# Patient Record
Sex: MALE | Race: WHITE | NOT HISPANIC OR LATINO | URBAN - METROPOLITAN AREA
[De-identification: names, ages, dates, MRNs, and addresses within clinical notes are randomized per-mention and may not be internally consistent; named-entity substitution may affect disease eponyms.]

---

## 2023-12-23 ENCOUNTER — INPATIENT (INPATIENT)
Facility: HOSPITAL | Age: 68
LOS: 5 days | Discharge: HOME | DRG: 683 | End: 2023-12-29
Attending: STUDENT IN AN ORGANIZED HEALTH CARE EDUCATION/TRAINING PROGRAM | Admitting: STUDENT IN AN ORGANIZED HEALTH CARE EDUCATION/TRAINING PROGRAM
Payer: MEDICARE

## 2023-12-23 VITALS
TEMPERATURE: 98 F | RESPIRATION RATE: 17 BRPM | DIASTOLIC BLOOD PRESSURE: 79 MMHG | OXYGEN SATURATION: 99 % | HEIGHT: 70 IN | HEART RATE: 98 BPM | SYSTOLIC BLOOD PRESSURE: 131 MMHG | WEIGHT: 177.91 LBS

## 2023-12-23 DIAGNOSIS — N17.9 ACUTE KIDNEY FAILURE, UNSPECIFIED: ICD-10-CM

## 2023-12-23 DIAGNOSIS — I10 ESSENTIAL (PRIMARY) HYPERTENSION: ICD-10-CM

## 2023-12-23 DIAGNOSIS — E11.9 TYPE 2 DIABETES MELLITUS WITHOUT COMPLICATIONS: ICD-10-CM

## 2023-12-23 DIAGNOSIS — E78.5 HYPERLIPIDEMIA, UNSPECIFIED: ICD-10-CM

## 2023-12-23 LAB
ALBUMIN SERPL ELPH-MCNC: 4.5 G/DL — SIGNIFICANT CHANGE UP (ref 3.3–5)
ALBUMIN SERPL ELPH-MCNC: 4.5 G/DL — SIGNIFICANT CHANGE UP (ref 3.3–5)
ALP SERPL-CCNC: 66 U/L — SIGNIFICANT CHANGE UP (ref 40–120)
ALP SERPL-CCNC: 66 U/L — SIGNIFICANT CHANGE UP (ref 40–120)
ALT FLD-CCNC: 15 U/L — SIGNIFICANT CHANGE UP (ref 10–45)
ALT FLD-CCNC: 15 U/L — SIGNIFICANT CHANGE UP (ref 10–45)
ANION GAP SERPL CALC-SCNC: 16 MMOL/L — SIGNIFICANT CHANGE UP (ref 5–17)
ANION GAP SERPL CALC-SCNC: 16 MMOL/L — SIGNIFICANT CHANGE UP (ref 5–17)
APPEARANCE UR: CLEAR — SIGNIFICANT CHANGE UP
APPEARANCE UR: CLEAR — SIGNIFICANT CHANGE UP
AST SERPL-CCNC: 12 U/L — SIGNIFICANT CHANGE UP (ref 10–40)
AST SERPL-CCNC: 12 U/L — SIGNIFICANT CHANGE UP (ref 10–40)
BACTERIA # UR AUTO: NEGATIVE /HPF — SIGNIFICANT CHANGE UP
BACTERIA # UR AUTO: NEGATIVE /HPF — SIGNIFICANT CHANGE UP
BASOPHILS # BLD AUTO: 0.1 K/UL — SIGNIFICANT CHANGE UP (ref 0–0.2)
BASOPHILS # BLD AUTO: 0.1 K/UL — SIGNIFICANT CHANGE UP (ref 0–0.2)
BASOPHILS NFR BLD AUTO: 1 % — SIGNIFICANT CHANGE UP (ref 0–2)
BASOPHILS NFR BLD AUTO: 1 % — SIGNIFICANT CHANGE UP (ref 0–2)
BILIRUB SERPL-MCNC: 0.2 MG/DL — SIGNIFICANT CHANGE UP (ref 0.2–1.2)
BILIRUB SERPL-MCNC: 0.2 MG/DL — SIGNIFICANT CHANGE UP (ref 0.2–1.2)
BILIRUB UR-MCNC: NEGATIVE — SIGNIFICANT CHANGE UP
BILIRUB UR-MCNC: NEGATIVE — SIGNIFICANT CHANGE UP
BUN SERPL-MCNC: 75 MG/DL — HIGH (ref 7–23)
BUN SERPL-MCNC: 75 MG/DL — HIGH (ref 7–23)
CALCIUM SERPL-MCNC: 10.1 MG/DL — SIGNIFICANT CHANGE UP (ref 8.4–10.5)
CALCIUM SERPL-MCNC: 10.1 MG/DL — SIGNIFICANT CHANGE UP (ref 8.4–10.5)
CAST: 0 /LPF — SIGNIFICANT CHANGE UP (ref 0–4)
CAST: 0 /LPF — SIGNIFICANT CHANGE UP (ref 0–4)
CHLORIDE SERPL-SCNC: 98 MMOL/L — SIGNIFICANT CHANGE UP (ref 96–108)
CHLORIDE SERPL-SCNC: 98 MMOL/L — SIGNIFICANT CHANGE UP (ref 96–108)
CO2 SERPL-SCNC: 20 MMOL/L — LOW (ref 22–31)
CO2 SERPL-SCNC: 20 MMOL/L — LOW (ref 22–31)
COLOR SPEC: YELLOW — SIGNIFICANT CHANGE UP
COLOR SPEC: YELLOW — SIGNIFICANT CHANGE UP
CREAT ?TM UR-MCNC: 36 MG/DL — SIGNIFICANT CHANGE UP
CREAT ?TM UR-MCNC: 36 MG/DL — SIGNIFICANT CHANGE UP
CREAT SERPL-MCNC: 3.47 MG/DL — HIGH (ref 0.5–1.3)
CREAT SERPL-MCNC: 3.47 MG/DL — HIGH (ref 0.5–1.3)
DIFF PNL FLD: ABNORMAL
DIFF PNL FLD: ABNORMAL
EGFR: 18 ML/MIN/1.73M2 — LOW
EGFR: 18 ML/MIN/1.73M2 — LOW
EOSINOPHIL # BLD AUTO: 0.37 K/UL — SIGNIFICANT CHANGE UP (ref 0–0.5)
EOSINOPHIL # BLD AUTO: 0.37 K/UL — SIGNIFICANT CHANGE UP (ref 0–0.5)
EOSINOPHIL NFR BLD AUTO: 3.5 % — SIGNIFICANT CHANGE UP (ref 0–6)
EOSINOPHIL NFR BLD AUTO: 3.5 % — SIGNIFICANT CHANGE UP (ref 0–6)
GLUCOSE BLDC GLUCOMTR-MCNC: 157 MG/DL — HIGH (ref 70–99)
GLUCOSE BLDC GLUCOMTR-MCNC: 157 MG/DL — HIGH (ref 70–99)
GLUCOSE SERPL-MCNC: 100 MG/DL — HIGH (ref 70–99)
GLUCOSE SERPL-MCNC: 100 MG/DL — HIGH (ref 70–99)
GLUCOSE UR QL: NEGATIVE MG/DL — SIGNIFICANT CHANGE UP
GLUCOSE UR QL: NEGATIVE MG/DL — SIGNIFICANT CHANGE UP
HCT VFR BLD CALC: 35.6 % — LOW (ref 39–50)
HCT VFR BLD CALC: 35.6 % — LOW (ref 39–50)
HGB BLD-MCNC: 11.6 G/DL — LOW (ref 13–17)
HGB BLD-MCNC: 11.6 G/DL — LOW (ref 13–17)
IMM GRANULOCYTES NFR BLD AUTO: 0.4 % — SIGNIFICANT CHANGE UP (ref 0–0.9)
IMM GRANULOCYTES NFR BLD AUTO: 0.4 % — SIGNIFICANT CHANGE UP (ref 0–0.9)
KETONES UR-MCNC: NEGATIVE MG/DL — SIGNIFICANT CHANGE UP
KETONES UR-MCNC: NEGATIVE MG/DL — SIGNIFICANT CHANGE UP
LEUKOCYTE ESTERASE UR-ACNC: NEGATIVE — SIGNIFICANT CHANGE UP
LEUKOCYTE ESTERASE UR-ACNC: NEGATIVE — SIGNIFICANT CHANGE UP
LYMPHOCYTES # BLD AUTO: 19.6 % — SIGNIFICANT CHANGE UP (ref 13–44)
LYMPHOCYTES # BLD AUTO: 19.6 % — SIGNIFICANT CHANGE UP (ref 13–44)
LYMPHOCYTES # BLD AUTO: 2.05 K/UL — SIGNIFICANT CHANGE UP (ref 1–3.3)
LYMPHOCYTES # BLD AUTO: 2.05 K/UL — SIGNIFICANT CHANGE UP (ref 1–3.3)
MCHC RBC-ENTMCNC: 26.6 PG — LOW (ref 27–34)
MCHC RBC-ENTMCNC: 26.6 PG — LOW (ref 27–34)
MCHC RBC-ENTMCNC: 32.6 GM/DL — SIGNIFICANT CHANGE UP (ref 32–36)
MCHC RBC-ENTMCNC: 32.6 GM/DL — SIGNIFICANT CHANGE UP (ref 32–36)
MCV RBC AUTO: 81.7 FL — SIGNIFICANT CHANGE UP (ref 80–100)
MCV RBC AUTO: 81.7 FL — SIGNIFICANT CHANGE UP (ref 80–100)
MONOCYTES # BLD AUTO: 1.15 K/UL — HIGH (ref 0–0.9)
MONOCYTES # BLD AUTO: 1.15 K/UL — HIGH (ref 0–0.9)
MONOCYTES NFR BLD AUTO: 11 % — SIGNIFICANT CHANGE UP (ref 2–14)
MONOCYTES NFR BLD AUTO: 11 % — SIGNIFICANT CHANGE UP (ref 2–14)
NEUTROPHILS # BLD AUTO: 6.74 K/UL — SIGNIFICANT CHANGE UP (ref 1.8–7.4)
NEUTROPHILS # BLD AUTO: 6.74 K/UL — SIGNIFICANT CHANGE UP (ref 1.8–7.4)
NEUTROPHILS NFR BLD AUTO: 64.5 % — SIGNIFICANT CHANGE UP (ref 43–77)
NEUTROPHILS NFR BLD AUTO: 64.5 % — SIGNIFICANT CHANGE UP (ref 43–77)
NITRITE UR-MCNC: NEGATIVE — SIGNIFICANT CHANGE UP
NITRITE UR-MCNC: NEGATIVE — SIGNIFICANT CHANGE UP
NRBC # BLD: 0 /100 WBCS — SIGNIFICANT CHANGE UP (ref 0–0)
NRBC # BLD: 0 /100 WBCS — SIGNIFICANT CHANGE UP (ref 0–0)
OSMOLALITY UR: 282 MOS/KG — LOW (ref 300–900)
OSMOLALITY UR: 282 MOS/KG — LOW (ref 300–900)
PH UR: 7 — SIGNIFICANT CHANGE UP (ref 5–8)
PH UR: 7 — SIGNIFICANT CHANGE UP (ref 5–8)
PLATELET # BLD AUTO: 353 K/UL — SIGNIFICANT CHANGE UP (ref 150–400)
PLATELET # BLD AUTO: 353 K/UL — SIGNIFICANT CHANGE UP (ref 150–400)
POTASSIUM SERPL-MCNC: 5 MMOL/L — SIGNIFICANT CHANGE UP (ref 3.5–5.3)
POTASSIUM SERPL-MCNC: 5 MMOL/L — SIGNIFICANT CHANGE UP (ref 3.5–5.3)
POTASSIUM SERPL-SCNC: 5 MMOL/L — SIGNIFICANT CHANGE UP (ref 3.5–5.3)
POTASSIUM SERPL-SCNC: 5 MMOL/L — SIGNIFICANT CHANGE UP (ref 3.5–5.3)
POTASSIUM UR-SCNC: 18 MMOL/L — SIGNIFICANT CHANGE UP
POTASSIUM UR-SCNC: 18 MMOL/L — SIGNIFICANT CHANGE UP
PROT ?TM UR-MCNC: 8 MG/DL — SIGNIFICANT CHANGE UP (ref 0–12)
PROT ?TM UR-MCNC: 8 MG/DL — SIGNIFICANT CHANGE UP (ref 0–12)
PROT SERPL-MCNC: 7.9 G/DL — SIGNIFICANT CHANGE UP (ref 6–8.3)
PROT SERPL-MCNC: 7.9 G/DL — SIGNIFICANT CHANGE UP (ref 6–8.3)
PROT UR-MCNC: NEGATIVE MG/DL — SIGNIFICANT CHANGE UP
PROT UR-MCNC: NEGATIVE MG/DL — SIGNIFICANT CHANGE UP
PROT/CREAT UR-RTO: 0.2 RATIO — SIGNIFICANT CHANGE UP (ref 0–0.2)
PROT/CREAT UR-RTO: 0.2 RATIO — SIGNIFICANT CHANGE UP (ref 0–0.2)
RAPID RVP RESULT: SIGNIFICANT CHANGE UP
RAPID RVP RESULT: SIGNIFICANT CHANGE UP
RBC # BLD: 4.36 M/UL — SIGNIFICANT CHANGE UP (ref 4.2–5.8)
RBC # BLD: 4.36 M/UL — SIGNIFICANT CHANGE UP (ref 4.2–5.8)
RBC # FLD: 13.4 % — SIGNIFICANT CHANGE UP (ref 10.3–14.5)
RBC # FLD: 13.4 % — SIGNIFICANT CHANGE UP (ref 10.3–14.5)
RBC CASTS # UR COMP ASSIST: 0 /HPF — SIGNIFICANT CHANGE UP (ref 0–4)
RBC CASTS # UR COMP ASSIST: 0 /HPF — SIGNIFICANT CHANGE UP (ref 0–4)
SARS-COV-2 RNA SPEC QL NAA+PROBE: SIGNIFICANT CHANGE UP
SARS-COV-2 RNA SPEC QL NAA+PROBE: SIGNIFICANT CHANGE UP
SODIUM SERPL-SCNC: 134 MMOL/L — LOW (ref 135–145)
SODIUM SERPL-SCNC: 134 MMOL/L — LOW (ref 135–145)
SODIUM UR-SCNC: 63 MMOL/L — SIGNIFICANT CHANGE UP
SODIUM UR-SCNC: 63 MMOL/L — SIGNIFICANT CHANGE UP
SP GR SPEC: 1.01 — SIGNIFICANT CHANGE UP (ref 1–1.03)
SP GR SPEC: 1.01 — SIGNIFICANT CHANGE UP (ref 1–1.03)
SQUAMOUS # UR AUTO: 0 /HPF — SIGNIFICANT CHANGE UP (ref 0–5)
SQUAMOUS # UR AUTO: 0 /HPF — SIGNIFICANT CHANGE UP (ref 0–5)
UROBILINOGEN FLD QL: 0.2 MG/DL — SIGNIFICANT CHANGE UP (ref 0.2–1)
UROBILINOGEN FLD QL: 0.2 MG/DL — SIGNIFICANT CHANGE UP (ref 0.2–1)
WBC # BLD: 10.45 K/UL — SIGNIFICANT CHANGE UP (ref 3.8–10.5)
WBC # BLD: 10.45 K/UL — SIGNIFICANT CHANGE UP (ref 3.8–10.5)
WBC # FLD AUTO: 10.45 K/UL — SIGNIFICANT CHANGE UP (ref 3.8–10.5)
WBC # FLD AUTO: 10.45 K/UL — SIGNIFICANT CHANGE UP (ref 3.8–10.5)
WBC UR QL: 4 /HPF — SIGNIFICANT CHANGE UP (ref 0–5)
WBC UR QL: 4 /HPF — SIGNIFICANT CHANGE UP (ref 0–5)

## 2023-12-23 PROCEDURE — 93010 ELECTROCARDIOGRAM REPORT: CPT

## 2023-12-23 PROCEDURE — 76775 US EXAM ABDO BACK WALL LIM: CPT | Mod: 26

## 2023-12-23 PROCEDURE — 99223 1ST HOSP IP/OBS HIGH 75: CPT

## 2023-12-23 PROCEDURE — 99285 EMERGENCY DEPT VISIT HI MDM: CPT | Mod: GC

## 2023-12-23 RX ORDER — ONDANSETRON 8 MG/1
4 TABLET, FILM COATED ORAL EVERY 8 HOURS
Refills: 0 | Status: DISCONTINUED | OUTPATIENT
Start: 2023-12-23 | End: 2023-12-29

## 2023-12-23 RX ORDER — AMLODIPINE BESYLATE 2.5 MG/1
5 TABLET ORAL DAILY
Refills: 0 | Status: DISCONTINUED | OUTPATIENT
Start: 2023-12-23 | End: 2023-12-29

## 2023-12-23 RX ORDER — LANOLIN ALCOHOL/MO/W.PET/CERES
3 CREAM (GRAM) TOPICAL AT BEDTIME
Refills: 0 | Status: DISCONTINUED | OUTPATIENT
Start: 2023-12-23 | End: 2023-12-29

## 2023-12-23 RX ORDER — SODIUM CHLORIDE 9 MG/ML
1000 INJECTION, SOLUTION INTRAVENOUS
Refills: 0 | Status: DISCONTINUED | OUTPATIENT
Start: 2023-12-23 | End: 2023-12-29

## 2023-12-23 RX ORDER — DEXTROSE 50 % IN WATER 50 %
12.5 SYRINGE (ML) INTRAVENOUS ONCE
Refills: 0 | Status: DISCONTINUED | OUTPATIENT
Start: 2023-12-23 | End: 2023-12-29

## 2023-12-23 RX ORDER — DEXTROSE 50 % IN WATER 50 %
25 SYRINGE (ML) INTRAVENOUS ONCE
Refills: 0 | Status: DISCONTINUED | OUTPATIENT
Start: 2023-12-23 | End: 2023-12-29

## 2023-12-23 RX ORDER — SODIUM CHLORIDE 9 MG/ML
1000 INJECTION INTRAMUSCULAR; INTRAVENOUS; SUBCUTANEOUS ONCE
Refills: 0 | Status: COMPLETED | OUTPATIENT
Start: 2023-12-23 | End: 2023-12-23

## 2023-12-23 RX ORDER — ATORVASTATIN CALCIUM 80 MG/1
20 TABLET, FILM COATED ORAL AT BEDTIME
Refills: 0 | Status: DISCONTINUED | OUTPATIENT
Start: 2023-12-23 | End: 2023-12-29

## 2023-12-23 RX ORDER — SODIUM CHLORIDE 9 MG/ML
1000 INJECTION INTRAMUSCULAR; INTRAVENOUS; SUBCUTANEOUS
Refills: 0 | Status: DISCONTINUED | OUTPATIENT
Start: 2023-12-23 | End: 2023-12-24

## 2023-12-23 RX ORDER — INSULIN LISPRO 100/ML
VIAL (ML) SUBCUTANEOUS AT BEDTIME
Refills: 0 | Status: DISCONTINUED | OUTPATIENT
Start: 2023-12-23 | End: 2023-12-29

## 2023-12-23 RX ORDER — INSULIN LISPRO 100/ML
VIAL (ML) SUBCUTANEOUS
Refills: 0 | Status: DISCONTINUED | OUTPATIENT
Start: 2023-12-23 | End: 2023-12-29

## 2023-12-23 RX ORDER — ACETAMINOPHEN 500 MG
650 TABLET ORAL EVERY 6 HOURS
Refills: 0 | Status: DISCONTINUED | OUTPATIENT
Start: 2023-12-23 | End: 2023-12-29

## 2023-12-23 RX ORDER — GLUCAGON INJECTION, SOLUTION 0.5 MG/.1ML
1 INJECTION, SOLUTION SUBCUTANEOUS ONCE
Refills: 0 | Status: DISCONTINUED | OUTPATIENT
Start: 2023-12-23 | End: 2023-12-29

## 2023-12-23 RX ORDER — HEPARIN SODIUM 5000 [USP'U]/ML
5000 INJECTION INTRAVENOUS; SUBCUTANEOUS EVERY 8 HOURS
Refills: 0 | Status: DISCONTINUED | OUTPATIENT
Start: 2023-12-23 | End: 2023-12-29

## 2023-12-23 RX ORDER — DEXTROSE 50 % IN WATER 50 %
15 SYRINGE (ML) INTRAVENOUS ONCE
Refills: 0 | Status: DISCONTINUED | OUTPATIENT
Start: 2023-12-23 | End: 2023-12-29

## 2023-12-23 RX ADMIN — SODIUM CHLORIDE 1000 MILLILITER(S): 9 INJECTION INTRAMUSCULAR; INTRAVENOUS; SUBCUTANEOUS at 18:04

## 2023-12-23 NOTE — H&P ADULT - PROBLEM SELECTOR PLAN 2
hold home regimen  follow up a1c  monitor fingerstick glu tidac + hs  carb consistent diet  low dose correctional scale lispro tidac + hs while inhouse  adjust regimen to maintain goal bg 100-180

## 2023-12-23 NOTE — ED PROVIDER NOTE - OBJECTIVE STATEMENT
67 yo M PMHx of HTN, DM presents with elevated Cr on outpatient labs. Patient endorses low grade fevers 1+ weeks, had an episode of syncope Thursday, went to PCP and was found to have elevated Cr, that jose from 2.9 to 3.5 in one day. No abdominal pain, flank pain, dysuria, decreased urine output, cp. No hx of kidney stones, kidney disease. Endorses congestion. Patient only on norvasc currently, he was told to stop ACE inhibitor, HCTZ and metformin.

## 2023-12-23 NOTE — ED PROVIDER NOTE - CLINICAL SUMMARY MEDICAL DECISION MAKING FREE TEXT BOX
67 yo M hx of dm, htn with rising cr on outpatient labs, vss - nonfocal exam. Will repeat labs, ekg, renal us to rule out hydro and admit, 69 yo M hx of dm, htn with rising cr on outpatient labs, vss - nonfocal exam. Will repeat labs, ekg, renal us to rule out hydro and admit,

## 2023-12-23 NOTE — ED ADULT TRIAGE NOTE - CHIEF COMPLAINT QUOTE
syncopal episode few days ago, saw PCP for f/u blood work, found to have increased creatinine 2.9. repeat blood work performed next day that showed creatinine 3.5

## 2023-12-23 NOTE — H&P ADULT - PROBLEM SELECTOR PLAN 1
unclear baseline renal function; reportedly, cr ~2.9 a few days ago; currently cr ~3.47  s/p 1 l ns bolus in er  follow up urine studies (UA, Cr, Lytes, Osm) + calculate FENa/FEUrea, renal/bladder US, freq bladder scan; anemia work up; mbd work up; abg/vbg  Monitor strict I/Os, daily weights, UO, BUN/Cr, volume status, acid-base balance, electrolytes   avoid nephrotoxic agents; appropriate dose adjustments for all renally cleared medications   judicious IVF + electrolyte supplementation as needed  nephro consult in am

## 2023-12-23 NOTE — H&P ADULT - NSHPREVIEWOFSYSTEMS_GEN_ALL_CORE
CONSTITUTIONAL: No fever. generalized weakness/fatigue/lehtargy  ENMT:  No sinus or throat pain  RESPIRATORY: No cough, wheezing, chills or hemoptysis; No shortness of breath  CARDIOVASCULAR: No chest pain, palpitations, dizziness, or leg swelling  GASTROINTESTINAL: No abdominal or epigastric pain. +nausea, vomiting, no hematemesis; No diarrhea or constipation. No melena or hematochezia.  GENITOURINARY: No dysuria or incontinence  NEUROLOGICAL: No headaches, memory loss, loss of strength, numbness, or tremors  SKIN: No rashes,  No hives or eczema  ENDOCRINE: No heat or cold intolerance; No hair loss  MUSCULOSKELETAL: No joint pain or swelling; No muscle, back, or extremity pain  PSYCHIATRIC: No depression, anxiety, mood swings, or difficulty sleeping  HEME/LYMPH: No easy bruising, or bleeding gums; no enlarged LN

## 2023-12-23 NOTE — ED ADULT NURSE NOTE - OBJECTIVE STATEMENT
Pt 67 y/o male, AxOx3, presents to ED from home for abnormal lab results. pt reporting near syncopal episode 4 days ago while standing and making lunch- was not seen in hospital after episode. Followed up with PCP, had blood work drawn showing Cr/ BUN to be elevated. PMH of HTN. Pt denies decreased urinary output or any other symptoms. Pt is well appearing, speaking full sentences without difficulty. Breathing spontaneous and unlabored. Ambulatory with steady gait. Upon assessment, abdomen soft and nontender, +strong peripheral pulses, moving all extremities without difficulty, lungs clear. Safety and comfort measures initiated- bed placed in lowest position and side rails raised. Pt oriented to call bell system. Pt 69 y/o male, AxOx3, presents to ED from home for abnormal lab results. pt reporting near syncopal episode 4 days ago while standing and making lunch- was not seen in hospital after episode. Followed up with PCP, had blood work drawn showing Cr/ BUN to be elevated. PMH of HTN. Pt denies decreased urinary output or any other symptoms. Pt is well appearing, speaking full sentences without difficulty. Breathing spontaneous and unlabored. Ambulatory with steady gait. Upon assessment, abdomen soft and nontender, +strong peripheral pulses, moving all extremities without difficulty, lungs clear. Safety and comfort measures initiated- bed placed in lowest position and side rails raised. Pt oriented to call bell system.

## 2023-12-23 NOTE — H&P ADULT - ASSESSMENT
67yo 80kg m w pmh htn, hld, dm, p/w abnormal lab result showing worsening renal function; in er, found to have heather; admit to medicine for further mgmt  69yo 80kg m w pmh htn, hld, dm, p/w abnormal lab result showing worsening renal function; in er, found to have heather; admit to medicine for further mgmt

## 2023-12-23 NOTE — ED ADULT NURSE REASSESSMENT NOTE - NS ED NURSE REASSESS COMMENT FT1
Pt observed sitting up in stretcher conversing with RN without difficulty. Breathing spontaneous and unlabored. Pt updated on plan of care awaiting bed assignment, RTM med. No acute distress noted. Call bell within reach.

## 2023-12-23 NOTE — H&P ADULT - NSHPPHYSICALEXAM_GEN_ALL_CORE
T(C): 36.9 (12-23-23 @ 19:55), Max: 36.9 (12-23-23 @ 19:55)  HR: 86 (12-23-23 @ 19:55) (60 - 98)  BP: 110/68 (12-23-23 @ 19:55) (105/71 - 131/79)  RR: 18 (12-23-23 @ 19:55) (16 - 18)  SpO2: 98% (12-23-23 @ 19:55) (98% - 99%)  GENERAL: NAD, lying in bed comfortably  EYES: EOMI, PERRLA; conjunctiva and sclera clear  ENMT: Moist oral mucosa, no pharyngeal injection or exudate   NECK: Supple, no palpable masses; no JVD  RESPIRATORY: Normal respiratory effort; lungs are clear to auscultation bilaterally  CARDIOVASCULAR: Regular rate and rhythm, normal S1 and S2, no murmur/rub/gallop; No lower extremity edema; Peripheral pulses are 2+ bilaterally  ABDOMEN: Nontender to palpation, normoactive bowel sounds, no rebound/guarding   MUSCULOSKELETAL:  no joint swelling or tenderness to palpation  PSYCH: A+O to person, place, and time; affect appropriate  NEUROLOGY: CN 2-12 are intact and symmetric; no gross motor or sensory deficits   SKIN: No rashes; no palpable lesions

## 2023-12-23 NOTE — ED PROVIDER NOTE - ATTENDING CONTRIBUTION TO CARE
cr going up / recent loc not worked up  rrr/clear lungs / no edema  new renal failure / poss cardiac syncope  labs / ekg/ monitor  / cbc/cmp/ua/admit for further testing.

## 2023-12-24 LAB
24R-OH-CALCIDIOL SERPL-MCNC: 46.5 NG/ML — SIGNIFICANT CHANGE UP (ref 30–80)
24R-OH-CALCIDIOL SERPL-MCNC: 46.5 NG/ML — SIGNIFICANT CHANGE UP (ref 30–80)
A1C WITH ESTIMATED AVERAGE GLUCOSE RESULT: 6.2 % — HIGH (ref 4–5.6)
A1C WITH ESTIMATED AVERAGE GLUCOSE RESULT: 6.2 % — HIGH (ref 4–5.6)
ALBUMIN SERPL ELPH-MCNC: 4 G/DL — SIGNIFICANT CHANGE UP (ref 3.3–5)
ALBUMIN SERPL ELPH-MCNC: 4 G/DL — SIGNIFICANT CHANGE UP (ref 3.3–5)
ALP SERPL-CCNC: 58 U/L — SIGNIFICANT CHANGE UP (ref 40–120)
ALP SERPL-CCNC: 58 U/L — SIGNIFICANT CHANGE UP (ref 40–120)
ALT FLD-CCNC: 11 U/L — SIGNIFICANT CHANGE UP (ref 10–45)
ALT FLD-CCNC: 11 U/L — SIGNIFICANT CHANGE UP (ref 10–45)
ANION GAP SERPL CALC-SCNC: 11 MMOL/L — SIGNIFICANT CHANGE UP (ref 5–17)
ANION GAP SERPL CALC-SCNC: 11 MMOL/L — SIGNIFICANT CHANGE UP (ref 5–17)
ANION GAP SERPL CALC-SCNC: 15 MMOL/L — SIGNIFICANT CHANGE UP (ref 5–17)
ANION GAP SERPL CALC-SCNC: 15 MMOL/L — SIGNIFICANT CHANGE UP (ref 5–17)
APTT BLD: 27.8 SEC — SIGNIFICANT CHANGE UP (ref 24.5–35.6)
APTT BLD: 27.8 SEC — SIGNIFICANT CHANGE UP (ref 24.5–35.6)
AST SERPL-CCNC: 9 U/L — LOW (ref 10–40)
AST SERPL-CCNC: 9 U/L — LOW (ref 10–40)
BASE EXCESS BLDV CALC-SCNC: -6.7 MMOL/L — LOW (ref -2–3)
BASE EXCESS BLDV CALC-SCNC: -6.7 MMOL/L — LOW (ref -2–3)
BASOPHILS # BLD AUTO: 0.1 K/UL — SIGNIFICANT CHANGE UP (ref 0–0.2)
BASOPHILS # BLD AUTO: 0.1 K/UL — SIGNIFICANT CHANGE UP (ref 0–0.2)
BASOPHILS NFR BLD AUTO: 0.9 % — SIGNIFICANT CHANGE UP (ref 0–2)
BASOPHILS NFR BLD AUTO: 0.9 % — SIGNIFICANT CHANGE UP (ref 0–2)
BILIRUB SERPL-MCNC: 0.3 MG/DL — SIGNIFICANT CHANGE UP (ref 0.2–1.2)
BILIRUB SERPL-MCNC: 0.3 MG/DL — SIGNIFICANT CHANGE UP (ref 0.2–1.2)
BUN SERPL-MCNC: 72 MG/DL — HIGH (ref 7–23)
BUN SERPL-MCNC: 72 MG/DL — HIGH (ref 7–23)
BUN SERPL-MCNC: 75 MG/DL — HIGH (ref 7–23)
BUN SERPL-MCNC: 75 MG/DL — HIGH (ref 7–23)
CA-I SERPL-SCNC: 1.21 MMOL/L — SIGNIFICANT CHANGE UP (ref 1.15–1.33)
CA-I SERPL-SCNC: 1.21 MMOL/L — SIGNIFICANT CHANGE UP (ref 1.15–1.33)
CALCIUM SERPL-MCNC: 8.8 MG/DL — SIGNIFICANT CHANGE UP (ref 8.4–10.5)
CALCIUM SERPL-MCNC: 8.8 MG/DL — SIGNIFICANT CHANGE UP (ref 8.4–10.5)
CALCIUM SERPL-MCNC: 9.4 MG/DL — SIGNIFICANT CHANGE UP (ref 8.4–10.5)
CALCIUM SERPL-MCNC: 9.4 MG/DL — SIGNIFICANT CHANGE UP (ref 8.4–10.5)
CALCIUM SERPL-MCNC: 9.5 MG/DL — SIGNIFICANT CHANGE UP (ref 8.4–10.5)
CALCIUM SERPL-MCNC: 9.5 MG/DL — SIGNIFICANT CHANGE UP (ref 8.4–10.5)
CHLORIDE BLDV-SCNC: 105 MMOL/L — SIGNIFICANT CHANGE UP (ref 96–108)
CHLORIDE BLDV-SCNC: 105 MMOL/L — SIGNIFICANT CHANGE UP (ref 96–108)
CHLORIDE SERPL-SCNC: 102 MMOL/L — SIGNIFICANT CHANGE UP (ref 96–108)
CHOLEST SERPL-MCNC: 92 MG/DL — SIGNIFICANT CHANGE UP
CHOLEST SERPL-MCNC: 92 MG/DL — SIGNIFICANT CHANGE UP
CO2 BLDV-SCNC: 21 MMOL/L — LOW (ref 22–26)
CO2 BLDV-SCNC: 21 MMOL/L — LOW (ref 22–26)
CO2 SERPL-SCNC: 19 MMOL/L — LOW (ref 22–31)
CO2 SERPL-SCNC: 19 MMOL/L — LOW (ref 22–31)
CO2 SERPL-SCNC: 23 MMOL/L — SIGNIFICANT CHANGE UP (ref 22–31)
CO2 SERPL-SCNC: 23 MMOL/L — SIGNIFICANT CHANGE UP (ref 22–31)
CREAT SERPL-MCNC: 3.39 MG/DL — HIGH (ref 0.5–1.3)
CREAT SERPL-MCNC: 3.39 MG/DL — HIGH (ref 0.5–1.3)
CREAT SERPL-MCNC: 3.5 MG/DL — HIGH (ref 0.5–1.3)
CREAT SERPL-MCNC: 3.5 MG/DL — HIGH (ref 0.5–1.3)
CULTURE RESULTS: SIGNIFICANT CHANGE UP
CULTURE RESULTS: SIGNIFICANT CHANGE UP
EGFR: 18 ML/MIN/1.73M2 — LOW
EGFR: 18 ML/MIN/1.73M2 — LOW
EGFR: 19 ML/MIN/1.73M2 — LOW
EGFR: 19 ML/MIN/1.73M2 — LOW
EOSINOPHIL # BLD AUTO: 0.39 K/UL — SIGNIFICANT CHANGE UP (ref 0–0.5)
EOSINOPHIL # BLD AUTO: 0.39 K/UL — SIGNIFICANT CHANGE UP (ref 0–0.5)
EOSINOPHIL NFR BLD AUTO: 3.5 % — SIGNIFICANT CHANGE UP (ref 0–6)
EOSINOPHIL NFR BLD AUTO: 3.5 % — SIGNIFICANT CHANGE UP (ref 0–6)
ESTIMATED AVERAGE GLUCOSE: 131 MG/DL — HIGH (ref 68–114)
ESTIMATED AVERAGE GLUCOSE: 131 MG/DL — HIGH (ref 68–114)
FERRITIN SERPL-MCNC: 384 NG/ML — SIGNIFICANT CHANGE UP (ref 30–400)
FERRITIN SERPL-MCNC: 384 NG/ML — SIGNIFICANT CHANGE UP (ref 30–400)
FOLATE SERPL-MCNC: >20 NG/ML — SIGNIFICANT CHANGE UP
FOLATE SERPL-MCNC: >20 NG/ML — SIGNIFICANT CHANGE UP
GAS PNL BLDV: 133 MMOL/L — LOW (ref 136–145)
GAS PNL BLDV: 133 MMOL/L — LOW (ref 136–145)
GAS PNL BLDV: SIGNIFICANT CHANGE UP
GLUCOSE BLDC GLUCOMTR-MCNC: 110 MG/DL — HIGH (ref 70–99)
GLUCOSE BLDC GLUCOMTR-MCNC: 110 MG/DL — HIGH (ref 70–99)
GLUCOSE BLDC GLUCOMTR-MCNC: 113 MG/DL — HIGH (ref 70–99)
GLUCOSE BLDC GLUCOMTR-MCNC: 113 MG/DL — HIGH (ref 70–99)
GLUCOSE BLDC GLUCOMTR-MCNC: 132 MG/DL — HIGH (ref 70–99)
GLUCOSE BLDC GLUCOMTR-MCNC: 132 MG/DL — HIGH (ref 70–99)
GLUCOSE BLDC GLUCOMTR-MCNC: 184 MG/DL — HIGH (ref 70–99)
GLUCOSE BLDC GLUCOMTR-MCNC: 184 MG/DL — HIGH (ref 70–99)
GLUCOSE BLDV-MCNC: 104 MG/DL — HIGH (ref 70–99)
GLUCOSE BLDV-MCNC: 104 MG/DL — HIGH (ref 70–99)
GLUCOSE SERPL-MCNC: 109 MG/DL — HIGH (ref 70–99)
GLUCOSE SERPL-MCNC: 109 MG/DL — HIGH (ref 70–99)
GLUCOSE SERPL-MCNC: 99 MG/DL — SIGNIFICANT CHANGE UP (ref 70–99)
GLUCOSE SERPL-MCNC: 99 MG/DL — SIGNIFICANT CHANGE UP (ref 70–99)
HAPTOGLOB SERPL-MCNC: 501 MG/DL — HIGH (ref 34–200)
HAPTOGLOB SERPL-MCNC: 501 MG/DL — HIGH (ref 34–200)
HCO3 BLDV-SCNC: 20 MMOL/L — LOW (ref 22–29)
HCO3 BLDV-SCNC: 20 MMOL/L — LOW (ref 22–29)
HCT VFR BLD CALC: 32 % — LOW (ref 39–50)
HCT VFR BLD CALC: 32 % — LOW (ref 39–50)
HCT VFR BLDA CALC: 32 % — LOW (ref 39–51)
HCT VFR BLDA CALC: 32 % — LOW (ref 39–51)
HCV AB S/CO SERPL IA: 0.07 S/CO — SIGNIFICANT CHANGE UP (ref 0–0.99)
HCV AB S/CO SERPL IA: 0.07 S/CO — SIGNIFICANT CHANGE UP (ref 0–0.99)
HCV AB SERPL-IMP: SIGNIFICANT CHANGE UP
HCV AB SERPL-IMP: SIGNIFICANT CHANGE UP
HDLC SERPL-MCNC: 32 MG/DL — LOW
HDLC SERPL-MCNC: 32 MG/DL — LOW
HGB BLD CALC-MCNC: 10.8 G/DL — LOW (ref 12.6–17.4)
HGB BLD CALC-MCNC: 10.8 G/DL — LOW (ref 12.6–17.4)
HGB BLD-MCNC: 10.5 G/DL — LOW (ref 13–17)
HGB BLD-MCNC: 10.5 G/DL — LOW (ref 13–17)
IMM GRANULOCYTES NFR BLD AUTO: 0.5 % — SIGNIFICANT CHANGE UP (ref 0–0.9)
IMM GRANULOCYTES NFR BLD AUTO: 0.5 % — SIGNIFICANT CHANGE UP (ref 0–0.9)
INR BLD: 1.14 RATIO — SIGNIFICANT CHANGE UP (ref 0.85–1.18)
INR BLD: 1.14 RATIO — SIGNIFICANT CHANGE UP (ref 0.85–1.18)
IRON SATN MFR SERPL: 14 % — LOW (ref 16–55)
IRON SATN MFR SERPL: 14 % — LOW (ref 16–55)
IRON SATN MFR SERPL: 35 UG/DL — LOW (ref 45–165)
IRON SATN MFR SERPL: 35 UG/DL — LOW (ref 45–165)
LACTATE BLDV-MCNC: 1.2 MMOL/L — SIGNIFICANT CHANGE UP (ref 0.5–2)
LACTATE BLDV-MCNC: 1.2 MMOL/L — SIGNIFICANT CHANGE UP (ref 0.5–2)
LDH SERPL L TO P-CCNC: 281 U/L — HIGH (ref 50–242)
LDH SERPL L TO P-CCNC: 281 U/L — HIGH (ref 50–242)
LIPID PNL WITH DIRECT LDL SERPL: 42 MG/DL — SIGNIFICANT CHANGE UP
LIPID PNL WITH DIRECT LDL SERPL: 42 MG/DL — SIGNIFICANT CHANGE UP
LYMPHOCYTES # BLD AUTO: 17.9 % — SIGNIFICANT CHANGE UP (ref 13–44)
LYMPHOCYTES # BLD AUTO: 17.9 % — SIGNIFICANT CHANGE UP (ref 13–44)
LYMPHOCYTES # BLD AUTO: 2 K/UL — SIGNIFICANT CHANGE UP (ref 1–3.3)
LYMPHOCYTES # BLD AUTO: 2 K/UL — SIGNIFICANT CHANGE UP (ref 1–3.3)
MAGNESIUM SERPL-MCNC: 2.3 MG/DL — SIGNIFICANT CHANGE UP (ref 1.6–2.6)
MAGNESIUM SERPL-MCNC: 2.3 MG/DL — SIGNIFICANT CHANGE UP (ref 1.6–2.6)
MCHC RBC-ENTMCNC: 26.9 PG — LOW (ref 27–34)
MCHC RBC-ENTMCNC: 26.9 PG — LOW (ref 27–34)
MCHC RBC-ENTMCNC: 32.8 GM/DL — SIGNIFICANT CHANGE UP (ref 32–36)
MCHC RBC-ENTMCNC: 32.8 GM/DL — SIGNIFICANT CHANGE UP (ref 32–36)
MCV RBC AUTO: 81.8 FL — SIGNIFICANT CHANGE UP (ref 80–100)
MCV RBC AUTO: 81.8 FL — SIGNIFICANT CHANGE UP (ref 80–100)
MONOCYTES # BLD AUTO: 1.25 K/UL — HIGH (ref 0–0.9)
MONOCYTES # BLD AUTO: 1.25 K/UL — HIGH (ref 0–0.9)
MONOCYTES NFR BLD AUTO: 11.2 % — SIGNIFICANT CHANGE UP (ref 2–14)
MONOCYTES NFR BLD AUTO: 11.2 % — SIGNIFICANT CHANGE UP (ref 2–14)
NEUTROPHILS # BLD AUTO: 7.37 K/UL — SIGNIFICANT CHANGE UP (ref 1.8–7.4)
NEUTROPHILS # BLD AUTO: 7.37 K/UL — SIGNIFICANT CHANGE UP (ref 1.8–7.4)
NEUTROPHILS NFR BLD AUTO: 66 % — SIGNIFICANT CHANGE UP (ref 43–77)
NEUTROPHILS NFR BLD AUTO: 66 % — SIGNIFICANT CHANGE UP (ref 43–77)
NON HDL CHOLESTEROL: 60 MG/DL — SIGNIFICANT CHANGE UP
NON HDL CHOLESTEROL: 60 MG/DL — SIGNIFICANT CHANGE UP
NRBC # BLD: 0 /100 WBCS — SIGNIFICANT CHANGE UP (ref 0–0)
NRBC # BLD: 0 /100 WBCS — SIGNIFICANT CHANGE UP (ref 0–0)
OB PNL STL: NEGATIVE — SIGNIFICANT CHANGE UP
OB PNL STL: NEGATIVE — SIGNIFICANT CHANGE UP
PCO2 BLDV: 42 MMHG — SIGNIFICANT CHANGE UP (ref 42–55)
PCO2 BLDV: 42 MMHG — SIGNIFICANT CHANGE UP (ref 42–55)
PH BLDV: 7.28 — LOW (ref 7.32–7.43)
PH BLDV: 7.28 — LOW (ref 7.32–7.43)
PHOSPHATE SERPL-MCNC: 4.1 MG/DL — SIGNIFICANT CHANGE UP (ref 2.5–4.5)
PHOSPHATE SERPL-MCNC: 4.1 MG/DL — SIGNIFICANT CHANGE UP (ref 2.5–4.5)
PLATELET # BLD AUTO: 344 K/UL — SIGNIFICANT CHANGE UP (ref 150–400)
PLATELET # BLD AUTO: 344 K/UL — SIGNIFICANT CHANGE UP (ref 150–400)
PO2 BLDV: 85 MMHG — HIGH (ref 25–45)
PO2 BLDV: 85 MMHG — HIGH (ref 25–45)
POTASSIUM BLDV-SCNC: 5.6 MMOL/L — HIGH (ref 3.5–5.1)
POTASSIUM BLDV-SCNC: 5.6 MMOL/L — HIGH (ref 3.5–5.1)
POTASSIUM SERPL-MCNC: 5 MMOL/L — SIGNIFICANT CHANGE UP (ref 3.5–5.3)
POTASSIUM SERPL-MCNC: 5 MMOL/L — SIGNIFICANT CHANGE UP (ref 3.5–5.3)
POTASSIUM SERPL-MCNC: 5.5 MMOL/L — HIGH (ref 3.5–5.3)
POTASSIUM SERPL-MCNC: 5.5 MMOL/L — HIGH (ref 3.5–5.3)
POTASSIUM SERPL-SCNC: 5 MMOL/L — SIGNIFICANT CHANGE UP (ref 3.5–5.3)
POTASSIUM SERPL-SCNC: 5 MMOL/L — SIGNIFICANT CHANGE UP (ref 3.5–5.3)
POTASSIUM SERPL-SCNC: 5.5 MMOL/L — HIGH (ref 3.5–5.3)
POTASSIUM SERPL-SCNC: 5.5 MMOL/L — HIGH (ref 3.5–5.3)
PROT SERPL-MCNC: 6.8 G/DL — SIGNIFICANT CHANGE UP (ref 6–8.3)
PROT SERPL-MCNC: 6.8 G/DL — SIGNIFICANT CHANGE UP (ref 6–8.3)
PROTHROM AB SERPL-ACNC: 12.5 SEC — SIGNIFICANT CHANGE UP (ref 9.5–13)
PROTHROM AB SERPL-ACNC: 12.5 SEC — SIGNIFICANT CHANGE UP (ref 9.5–13)
PTH-INTACT FLD-MCNC: 49 PG/ML — SIGNIFICANT CHANGE UP (ref 15–65)
PTH-INTACT FLD-MCNC: 49 PG/ML — SIGNIFICANT CHANGE UP (ref 15–65)
RBC # BLD: 3.91 M/UL — LOW (ref 4.2–5.8)
RBC # FLD: 13.7 % — SIGNIFICANT CHANGE UP (ref 10.3–14.5)
RBC # FLD: 13.7 % — SIGNIFICANT CHANGE UP (ref 10.3–14.5)
RETICS #: 17.2 K/UL — LOW (ref 25–125)
RETICS #: 17.2 K/UL — LOW (ref 25–125)
RETICS/RBC NFR: 0.4 % — LOW (ref 0.5–2.5)
RETICS/RBC NFR: 0.4 % — LOW (ref 0.5–2.5)
SAO2 % BLDV: 96.1 % — HIGH (ref 67–88)
SAO2 % BLDV: 96.1 % — HIGH (ref 67–88)
SODIUM SERPL-SCNC: 136 MMOL/L — SIGNIFICANT CHANGE UP (ref 135–145)
SPECIMEN SOURCE: SIGNIFICANT CHANGE UP
SPECIMEN SOURCE: SIGNIFICANT CHANGE UP
TIBC SERPL-MCNC: 247 UG/DL — SIGNIFICANT CHANGE UP (ref 220–430)
TIBC SERPL-MCNC: 247 UG/DL — SIGNIFICANT CHANGE UP (ref 220–430)
TRIGL SERPL-MCNC: 91 MG/DL — SIGNIFICANT CHANGE UP
TRIGL SERPL-MCNC: 91 MG/DL — SIGNIFICANT CHANGE UP
TSH SERPL-MCNC: 2.16 UIU/ML — SIGNIFICANT CHANGE UP (ref 0.27–4.2)
TSH SERPL-MCNC: 2.16 UIU/ML — SIGNIFICANT CHANGE UP (ref 0.27–4.2)
UIBC SERPL-MCNC: 212 UG/DL — SIGNIFICANT CHANGE UP (ref 110–370)
UIBC SERPL-MCNC: 212 UG/DL — SIGNIFICANT CHANGE UP (ref 110–370)
UUN UR-MCNC: 361 MG/DL — SIGNIFICANT CHANGE UP
UUN UR-MCNC: 361 MG/DL — SIGNIFICANT CHANGE UP
VIT B12 SERPL-MCNC: 1049 PG/ML — SIGNIFICANT CHANGE UP (ref 232–1245)
VIT B12 SERPL-MCNC: 1049 PG/ML — SIGNIFICANT CHANGE UP (ref 232–1245)
WBC # BLD: 11.17 K/UL — HIGH (ref 3.8–10.5)
WBC # BLD: 11.17 K/UL — HIGH (ref 3.8–10.5)
WBC # FLD AUTO: 11.17 K/UL — HIGH (ref 3.8–10.5)
WBC # FLD AUTO: 11.17 K/UL — HIGH (ref 3.8–10.5)

## 2023-12-24 RX ORDER — SODIUM ZIRCONIUM CYCLOSILICATE 10 G/10G
10 POWDER, FOR SUSPENSION ORAL ONCE
Refills: 0 | Status: COMPLETED | OUTPATIENT
Start: 2023-12-24 | End: 2023-12-24

## 2023-12-24 RX ORDER — SODIUM BICARBONATE 1 MEQ/ML
0.07 SYRINGE (ML) INTRAVENOUS
Qty: 75 | Refills: 0 | Status: DISCONTINUED | OUTPATIENT
Start: 2023-12-24 | End: 2023-12-25

## 2023-12-24 RX ORDER — SODIUM CHLORIDE 9 MG/ML
1000 INJECTION INTRAMUSCULAR; INTRAVENOUS; SUBCUTANEOUS
Refills: 0 | Status: DISCONTINUED | OUTPATIENT
Start: 2023-12-24 | End: 2023-12-24

## 2023-12-24 RX ADMIN — SODIUM CHLORIDE 75 MILLILITER(S): 9 INJECTION INTRAMUSCULAR; INTRAVENOUS; SUBCUTANEOUS at 09:34

## 2023-12-24 RX ADMIN — ATORVASTATIN CALCIUM 20 MILLIGRAM(S): 80 TABLET, FILM COATED ORAL at 21:14

## 2023-12-24 RX ADMIN — Medication 75 MEQ/KG/HR: at 11:46

## 2023-12-24 RX ADMIN — SODIUM ZIRCONIUM CYCLOSILICATE 10 GRAM(S): 10 POWDER, FOR SUSPENSION ORAL at 08:57

## 2023-12-24 RX ADMIN — AMLODIPINE BESYLATE 5 MILLIGRAM(S): 2.5 TABLET ORAL at 05:39

## 2023-12-24 RX ADMIN — SODIUM ZIRCONIUM CYCLOSILICATE 10 GRAM(S): 10 POWDER, FOR SUSPENSION ORAL at 12:57

## 2023-12-24 NOTE — CONSULT NOTE ADULT - ASSESSMENT
69yo 80kg m w pmh htn, hld, dm, p/w abnormal lab result showing worsening renal function; in    IBAN  baseline scr unknown  renal sonogram with no hydro  check bladder scan to rule out retention   urine lytes inconclusive  Ua with no protein no rbc  trial of gentle hydration today     hyperkalemia  sec to RF  Rule out retention   lokelma 10gm x 2 doses   low k diet   monitor closely     HTN  BP stable  monitor     acidosis  IV bicarb  monitor serum co2     67yo 80kg m w pmh htn, hld, dm, p/w abnormal lab result showing worsening renal function; in    IBAN  baseline scr unknown  renal sonogram with no hydro  check bladder scan to rule out retention   urine lytes inconclusive  Ua with no protein no rbc  trial of gentle hydration today     hyperkalemia  sec to RF  Rule out retention   lokelma 10gm x 2 doses   low k diet   monitor closely     HTN  BP stable  monitor     acidosis  IV bicarb  monitor serum co2     67yo 80kg m w pmh htn, hld, dm, p/w abnormal lab result showing worsening renal function; in    IBAN  baseline scr unknown, per pt no prior history of kidney disease  iban likely sec to nsaid use/ hctz/ ace and hypotensive episode --  renal sonogram with no hydro  check bladder scan to rule out retention   urine lytes inconclusive  Ua with no protein no rbc  continue gentle hydration today   monitor closely   avoid nephrotoxics, nsaids rca    hyperkalemia  sec to RF  Rule out retention   lokelma 10gm x 2 doses   repeat in pm   low k diet   monitor closely     HTN  BP stable  monitor     acidosis  IV bicarb  monitor serum co2      discussed with family at  bedside and medical attending

## 2023-12-24 NOTE — PROVIDER CONTACT NOTE (OTHER) - ACTION/TREATMENT ORDERED:
provider aware via teams. as per provider he will pass it on to the day team. care plan continues, pt safety maintained.

## 2023-12-24 NOTE — PROVIDER CONTACT NOTE (OTHER) - REASON
----- Message from Mona Naqvi sent at 2/14/2023  9:49 AM CST -----  Contact: self  Type:  RX Refill Request    Who Called:  Patient  Refill or New Rx:  refill  RX Name and Strength:  pantoprazole (PROTONIX) 40 MG tablet      How is the patient currently taking it? (ex. 1XDay):  as directed  Is this a 30 day or 90 day RX:  as directed  Preferred Pharmacy with phone number:    [id number] [place] [address] AT [facility] [address]  [address]  [facility] [address]  Phone: [phone] Fax: [phone]        Local or Mail Order:  local  Ordering Provider:  Matt Lane Call Back Number:  [phone]    Additional Information:  Pt states [facility] said the prescription was denied. Please call back        pt refused heparin subq and lipitor, bladder scan 145mL, pt urinating well

## 2023-12-24 NOTE — CONSULT NOTE ADULT - SUBJECTIVE AND OBJECTIVE BOX
[facility] [place] NEPHROLOGY PRACTICE   MD ARMOND BRIGGS MD RUORU WONG, PA    TEL:  FROM 9 AM to 5 PM--OFFICE: [phone]  AVAILABLE oN TEAMS   FROM 5 PM- 9 AM PLEASE CALL ANSWERING SERVICE AT [phone]    -- INITIAL RENAL CONSULT NOTE --- Date Of service 12-24-23 @ 09:29  --------------------------------------------------------------------------------  HPI:  67yo 80kg m w pmh htn, hld, dm, p/w abnormal lab result showing worsening renal function; in er, found to have heather; admit to medicine for further mgmt  (23 Dec 2023 20:44)        PAST HISTORY  --------------------------------------------------------------------------------  PAST MEDICAL & SURGICAL HISTORY:  HTN (hypertension)      HLD (hyperlipidemia)      DM (diabetes mellitus)        FAMILY HISTORY:    PAST SOCIAL HISTORY:    ALLERGIES & MEDICATIONS  --------------------------------------------------------------------------------  Allergies    No Known Allergies    Intolerances      Standing Inpatient Medications  amLODIPine   Tablet 5 milliGRAM(s) Oral daily  atorvastatin 20 milliGRAM(s) Oral at bedtime  dextrose 5%. 1000 milliLiter(s) IV Continuous <Continuous>  dextrose 5%. 1000 milliLiter(s) IV Continuous <Continuous>  dextrose 50% Injectable 12.5 Gram(s) IV Push once  dextrose 50% Injectable 25 Gram(s) IV Push once  dextrose 50% Injectable 25 Gram(s) IV Push once  glucagon  Injectable 1 milliGRAM(s) IntraMuscular once  heparin   Injectable 5000 Unit(s) SubCutaneous every 8 hours  insulin lispro (ADMELOG) corrective regimen sliding scale   SubCutaneous three times a day before meals  insulin lispro (ADMELOG) corrective regimen sliding scale   SubCutaneous at bedtime  sodium chloride 0.9%. 1000 milliLiter(s) IV Continuous <Continuous>    PRN Inpatient Medications  acetaminophen     Tablet .. 650 milliGRAM(s) Oral every 6 hours PRN  aluminum hydroxide/magnesium hydroxide/simethicone Suspension 30 milliLiter(s) Oral every 4 hours PRN  dextrose Oral Gel 15 Gram(s) Oral once PRN  melatonin 3 milliGRAM(s) Oral at bedtime PRN  ondansetron Injectable 4 milliGRAM(s) IV Push every 8 hours PRN      REVIEW OF SYSTEMS  --------------------------------------------------------------------------------  Gen: No fevers/chills  Skin: No rashes  Head/Eyes/Ears: Normal hearing,  Normal vision   Respiratory: No dyspnea, cough  CV: No chest pain  GI: No abdominal pain, diarrhea, constipation, nausea, vomiting  : No dysuria, hematuria  MSK: No  edema  Heme: No easy bruising or bleeding  Psych: No significant depression    All other systems were reviewed and are negative, except as noted.    VITALS/PHYSICAL EXAM  --------------------------------------------------------------------------------  T(C): 36.6 (12-24-23 @ 08:45), Max: 36.9 (12-23-23 @ 19:55)  HR: 88 (12-24-23 @ 08:45) (60 - 98)  BP: 109/67 (12-24-23 @ 08:45) (102/62 - 131/79)  RR: 18 (12-24-23 @ 08:45) (16 - 18)  SpO2: 98% (12-24-23 @ 08:45) (97% - 99%)  Wt(kg): --  Height (cm): 177.8 (12-23-23 @ 12:39)  Weight (kg): 80.7 (12-23-23 @ 12:39)  BMI (kg/m2): 25.5 (12-23-23 @ 12:39)  BSA (m2): 1.99 (12-23-23 @ 12:39)      12-23-23 @ 07:01  -  12-24-23 @ 07:00  --------------------------------------------------------  IN: 150 mL / OUT: 550 mL / NET: -400 mL      Physical Exam:  	Gen: NAD  	HEENT: MMM  	Pulm: CTA B/L  	CV: S1S2  	Abd: Soft, +BS   	Ext: No LE edema B/L  	Neuro: Awake, alert  	Skin: Warm and dry  	Vascular access: No HD catheter           : jack  LABS/STUDIES  --------------------------------------------------------------------------------              10.5   11.17 >-----------<  344      [12-24-23 @ 07:21]              32.0     136  |  102  |  72  ----------------------------<  99      [12-24-23 @ 07:14]  5.5   |  19  |  3.50        Ca     9.5     [12-24-23 @ 07:14]      Mg     2.3     [12-24-23 @ 07:14]      Phos  4.1     [12-24-23 @ 07:14]    TPro  6.8  /  Alb  4.0  /  TBili  0.3  /  DBili  x   /  AST  9   /  ALT  11  /  AlkPhos  58  [12-24-23 @ 07:14]    PT/INR: PT 12.5 , INR 1.14       [12-24-23 @ 07:18]  PTT: 27.8       [12-24-23 @ 07:18]          [12-24-23 @ 07:14]    Creatinine Trend:  SCr 3.50 [12-24 @ 07:14]  SCr 3.47 [12-23 @ 15:26]    Urinalysis - [12-24-23 @ 07:14]      Color  / Appearance  / SG  / pH       Gluc 99 / Ketone   / Bili  / Urobili        Blood  / Protein  / Leuk Est  / Nitrite       RBC  / WBC  / Hyaline  / Gran  / Sq Epi  / Non Sq Epi  / Bacteria     Urine Creatinine 36      [12-23-23 @ 22:39]  Urine Protein 8      [12-23-23 @ 22:39]  Urine Sodium 63      [12-23-23 @ 22:39]  Urine Urea Nitrogen 361      [12-23-23 @ 22:39]  Urine Potassium 18      [12-23-23 @ 22:39]  Urine Osmolality 282      [12-23-23 @ 22:39]         [facility] [place] [facility]ROLOGY PRACTICE   MD ARMOND BRIGGS MD RUORU WONG, PA    TEL:  FROM 9 AM to 5 PM--OFFICE: [phone]  AVAILABLE oN TEAMS   FROM 5 PM- 9 AM PLEASE CALL ANSWERING SERVICE AT [phone]    -- INITIAL RENAL CONSULT NOTE --- Date Of service 12-24-23 @ 09:29  --------------------------------------------------------------------------------  HPI:  69yo 80kg m w pmh htn, hld, dm, p/w abnormal lab result showing worsening renal function; in er, found to have heather; admit to medicine for further mgmt  (23 Dec 2023 20:44)        PAST HISTORY  --------------------------------------------------------------------------------  PAST MEDICAL & SURGICAL HISTORY:  HTN (hypertension)      HLD (hyperlipidemia)      DM (diabetes mellitus)        FAMILY HISTORY:    PAST SOCIAL HISTORY:    ALLERGIES & MEDICATIONS  --------------------------------------------------------------------------------  Allergies    No Known Allergies    Intolerances      Standing Inpatient Medications  amLODIPine   Tablet 5 milliGRAM(s) Oral daily  atorvastatin 20 milliGRAM(s) Oral at bedtime  dextrose 5%. 1000 milliLiter(s) IV Continuous <Continuous>  dextrose 5%. 1000 milliLiter(s) IV Continuous <Continuous>  dextrose 50% Injectable 12.5 Gram(s) IV Push once  dextrose 50% Injectable 25 Gram(s) IV Push once  dextrose 50% Injectable 25 Gram(s) IV Push once  glucagon  Injectable 1 milliGRAM(s) IntraMuscular once  heparin   Injectable 5000 Unit(s) SubCutaneous every 8 hours  insulin lispro (ADMELOG) corrective regimen sliding scale   SubCutaneous three times a day before meals  insulin lispro (ADMELOG) corrective regimen sliding scale   SubCutaneous at bedtime  sodium chloride 0.9%. 1000 milliLiter(s) IV Continuous <Continuous>    PRN Inpatient Medications  acetaminophen     Tablet .. 650 milliGRAM(s) Oral every 6 hours PRN  aluminum hydroxide/magnesium hydroxide/simethicone Suspension 30 milliLiter(s) Oral every 4 hours PRN  dextrose Oral Gel 15 Gram(s) Oral once PRN  melatonin 3 milliGRAM(s) Oral at bedtime PRN  ondansetron Injectable 4 milliGRAM(s) IV Push every 8 hours PRN      REVIEW OF SYSTEMS  --------------------------------------------------------------------------------  Gen: No fevers/chills  Skin: No rashes  Head/Eyes/Ears: Normal hearing,  Normal vision   Respiratory: No dyspnea, cough  CV: No chest pain  GI: No abdominal pain, diarrhea, constipation, nausea, vomiting  : No dysuria, hematuria  MSK: No  edema  Heme: No easy bruising or bleeding  Psych: No significant depression    All other systems were reviewed and are negative, except as noted.    VITALS/PHYSICAL EXAM  --------------------------------------------------------------------------------  T(C): 36.6 (12-24-23 @ 08:45), Max: 36.9 (12-23-23 @ 19:55)  HR: 88 (12-24-23 @ 08:45) (60 - 98)  BP: 109/67 (12-24-23 @ 08:45) (102/62 - 131/79)  RR: 18 (12-24-23 @ 08:45) (16 - 18)  SpO2: 98% (12-24-23 @ 08:45) (97% - 99%)  Wt(kg): --  Height (cm): 177.8 (12-23-23 @ 12:39)  Weight (kg): 80.7 (12-23-23 @ 12:39)  BMI (kg/m2): 25.5 (12-23-23 @ 12:39)  BSA (m2): 1.99 (12-23-23 @ 12:39)      12-23-23 @ 07:01  -  12-24-23 @ 07:00  --------------------------------------------------------  IN: 150 mL / OUT: 550 mL / NET: -400 mL      Physical Exam:  	Gen: NAD  	HEENT: MMM  	Pulm: CTA B/L  	CV: S1S2  	Abd: Soft, +BS   	Ext: No LE edema B/L  	Neuro: Awake, alert  	Skin: Warm and dry  	Vascular access: No HD catheter           : jack  LABS/STUDIES  --------------------------------------------------------------------------------              10.5   11.17 >-----------<  344      [12-24-23 @ 07:21]              32.0     136  |  102  |  72  ----------------------------<  99      [12-24-23 @ 07:14]  5.5   |  19  |  3.50        Ca     9.5     [12-24-23 @ 07:14]      Mg     2.3     [12-24-23 @ 07:14]      Phos  4.1     [12-24-23 @ 07:14]    TPro  6.8  /  Alb  4.0  /  TBili  0.3  /  DBili  x   /  AST  9   /  ALT  11  /  AlkPhos  58  [12-24-23 @ 07:14]    PT/INR: PT 12.5 , INR 1.14       [12-24-23 @ 07:18]  PTT: 27.8       [12-24-23 @ 07:18]          [12-24-23 @ 07:14]    Creatinine Trend:  SCr 3.50 [12-24 @ 07:14]  SCr 3.47 [12-23 @ 15:26]    Urinalysis - [12-24-23 @ 07:14]      Color  / Appearance  / SG  / pH       Gluc 99 / Ketone   / Bili  / Urobili        Blood  / Protein  / Leuk Est  / Nitrite       RBC  / WBC  / Hyaline  / Gran  / Sq Epi  / Non Sq Epi  / Bacteria     Urine Creatinine 36      [12-23-23 @ 22:39]  Urine Protein 8      [12-23-23 @ 22:39]  Urine Sodium 63      [12-23-23 @ 22:39]  Urine Urea Nitrogen 361      [12-23-23 @ 22:39]  Urine Potassium 18      [12-23-23 @ 22:39]  Urine Osmolality 282      [12-23-23 @ 22:39]         [facility] [place] [facility]ROLOGY PRACTICE   MD ARMOND BRGIGS MD RUORU WONG, PA    TEL:  FROM 9 AM to 5 PM--OFFICE: [phone]  AVAILABLE oN TEAMS   FROM 5 PM- 9 AM PLEASE CALL ANSWERING SERVICE AT [phone]    -- INITIAL RENAL CONSULT NOTE --- Date Of service 12-24-23 @ 09:29  --------------------------------------------------------------------------------  HPI:  69yo 80kg m w pmh htn, hld, dm, p/w abnormal lab result showing worsening renal function; in er, found to have heather; admit to medicine for further mgmt  (23 Dec 2023 20:44)  PT had covid in nov 2023  was on advil tid x 1 week  had syncopal event on dec 21 , sec to low bp   was on benazipril and hctz until  a day ago          PAST HISTORY  --------------------------------------------------------------------------------  PAST MEDICAL & SURGICAL HISTORY:  HTN (hypertension)      HLD (hyperlipidemia)      DM (diabetes mellitus)        FAMILY HISTORY:    PAST SOCIAL HISTORY:    ALLERGIES & MEDICATIONS  --------------------------------------------------------------------------------  Allergies    No Known Allergies    Intolerances      Standing Inpatient Medications  amLODIPine   Tablet 5 milliGRAM(s) Oral daily  atorvastatin 20 milliGRAM(s) Oral at bedtime  dextrose 5%. 1000 milliLiter(s) IV Continuous <Continuous>  dextrose 5%. 1000 milliLiter(s) IV Continuous <Continuous>  dextrose 50% Injectable 12.5 Gram(s) IV Push once  dextrose 50% Injectable 25 Gram(s) IV Push once  dextrose 50% Injectable 25 Gram(s) IV Push once  glucagon  Injectable 1 milliGRAM(s) IntraMuscular once  heparin   Injectable 5000 Unit(s) SubCutaneous every 8 hours  insulin lispro (ADMELOG) corrective regimen sliding scale   SubCutaneous three times a day before meals  insulin lispro (ADMELOG) corrective regimen sliding scale   SubCutaneous at bedtime  sodium chloride 0.9%. 1000 milliLiter(s) IV Continuous <Continuous>    PRN Inpatient Medications  acetaminophen     Tablet .. 650 milliGRAM(s) Oral every 6 hours PRN  aluminum hydroxide/magnesium hydroxide/simethicone Suspension 30 milliLiter(s) Oral every 4 hours PRN  dextrose Oral Gel 15 Gram(s) Oral once PRN  melatonin 3 milliGRAM(s) Oral at bedtime PRN  ondansetron Injectable 4 milliGRAM(s) IV Push every 8 hours PRN      REVIEW OF SYSTEMS  --------------------------------------------------------------------------------  Gen: No fevers/chills  Skin: No rashes  Head/Eyes/Ears: Normal hearing,  Normal vision   Respiratory: No dyspnea, cough  CV: No chest pain  GI: No abdominal pain, diarrhea, constipation, nausea, vomiting  : No dysuria, hematuria  MSK: No  edema  Heme: No easy bruising or bleeding  Psych: No significant depression    All other systems were reviewed and are negative, except as noted.    VITALS/PHYSICAL EXAM  --------------------------------------------------------------------------------  T(C): 36.6 (12-24-23 @ 08:45), Max: 36.9 (12-23-23 @ 19:55)  HR: 88 (12-24-23 @ 08:45) (60 - 98)  BP: 109/67 (12-24-23 @ 08:45) (102/62 - 131/79)  RR: 18 (12-24-23 @ 08:45) (16 - 18)  SpO2: 98% (12-24-23 @ 08:45) (97% - 99%)  Wt(kg): --  Height (cm): 177.8 (12-23-23 @ 12:39)  Weight (kg): 80.7 (12-23-23 @ 12:39)  BMI (kg/m2): 25.5 (12-23-23 @ 12:39)  BSA (m2): 1.99 (12-23-23 @ 12:39)      12-23-23 @ 07:01  -  12-24-23 @ 07:00  --------------------------------------------------------  IN: 150 mL / OUT: 550 mL / NET: -400 mL      Physical Exam:  	Gen: NAD  	HEENT: MMM  	Pulm: CTA B/L  	CV: S1S2  	Abd: Soft, +BS   	Ext: No LE edema B/L  	Neuro: Awake, alert  	Skin: Warm and dry  	Vascular access: No HD catheter           :  no jack  LABS/STUDIES  --------------------------------------------------------------------------------              10.5   11.17 >-----------<  344      [12-24-23 @ 07:21]              32.0     136  |  102  |  72  ----------------------------<  99      [12-24-23 @ 07:14]  5.5   |  19  |  3.50        Ca     9.5     [12-24-23 @ 07:14]      Mg     2.3     [12-24-23 @ 07:14]      Phos  4.1     [12-24-23 @ 07:14]    TPro  6.8  /  Alb  4.0  /  TBili  0.3  /  DBili  x   /  AST  9   /  ALT  11  /  AlkPhos  58  [12-24-23 @ 07:14]    PT/INR: PT 12.5 , INR 1.14       [12-24-23 @ 07:18]  PTT: 27.8       [12-24-23 @ 07:18]          [12-24-23 @ 07:14]    Creatinine Trend:  SCr 3.50 [12-24 @ 07:14]  SCr 3.47 [12-23 @ 15:26]    Urinalysis - [12-24-23 @ 07:14]      Color  / Appearance  / SG  / pH       Gluc 99 / Ketone   / Bili  / Urobili        Blood  / Protein  / Leuk Est  / Nitrite       RBC  / WBC  / Hyaline  / Gran  / Sq Epi  / Non Sq Epi  / Bacteria     Urine Creatinine 36      [12-23-23 @ 22:39]  Urine Protein 8      [12-23-23 @ 22:39]  Urine Sodium 63      [12-23-23 @ 22:39]  Urine Urea Nitrogen 361      [12-23-23 @ 22:39]  Urine Potassium 18      [12-23-23 @ 22:39]  Urine Osmolality 282      [12-23-23 @ 22:39]         [facility]   MD ARMOND BRIGGS MD RUORU WONG, PA    TEL:  FROM 9 AM to 5 PM--OFFICE: [phone]  AVAILABLE oN TEAMS   FROM 5 PM- 9 AM PLEASE CALL ANSWERING SERVICE AT [phone]    -- INITIAL RENAL CONSULT NOTE --- Date Of service 12-24-23 @ 09:29  --------------------------------------------------------------------------------  HPI:  67yo 80kg m w pmh htn, hld, dm, p/w abnormal lab result showing worsening renal function; in er, found to have heather; admit to medicine for further mgmt  (23 Dec 2023 20:44)  PT had covid in nov 2023  was on advil tid x 1 week  had syncopal event on dec 21 , sec to low bp   was on benazipril and hctz until  a day ago          PAST HISTORY  --------------------------------------------------------------------------------  PAST MEDICAL & SURGICAL HISTORY:  HTN (hypertension)      HLD (hyperlipidemia)      DM (diabetes mellitus)        FAMILY HISTORY:    PAST SOCIAL HISTORY:    ALLERGIES & MEDICATIONS  --------------------------------------------------------------------------------  Allergies    No Known Allergies    Intolerances      Standing Inpatient Medications  amLODIPine   Tablet 5 milliGRAM(s) Oral daily  atorvastatin 20 milliGRAM(s) Oral at bedtime  dextrose 5%. 1000 milliLiter(s) IV Continuous <Continuous>  dextrose 5%. 1000 milliLiter(s) IV Continuous <Continuous>  dextrose 50% Injectable 12.5 Gram(s) IV Push once  dextrose 50% Injectable 25 Gram(s) IV Push once  dextrose 50% Injectable 25 Gram(s) IV Push once  glucagon  Injectable 1 milliGRAM(s) IntraMuscular once  heparin   Injectable 5000 Unit(s) SubCutaneous every 8 hours  insulin lispro (ADMELOG) corrective regimen sliding scale   SubCutaneous three times a day before meals  insulin lispro (ADMELOG) corrective regimen sliding scale   SubCutaneous at bedtime  sodium chloride 0.9%. 1000 milliLiter(s) IV Continuous <Continuous>    PRN Inpatient Medications  acetaminophen     Tablet .. 650 milliGRAM(s) Oral every 6 hours PRN  aluminum hydroxide/magnesium hydroxide/simethicone Suspension 30 milliLiter(s) Oral every 4 hours PRN  dextrose Oral Gel 15 Gram(s) Oral once PRN  melatonin 3 milliGRAM(s) Oral at bedtime PRN  ondansetron Injectable 4 milliGRAM(s) IV Push every 8 hours PRN      REVIEW OF SYSTEMS  --------------------------------------------------------------------------------  Gen: No fevers/chills  Skin: No rashes  Head/Eyes/Ears: Normal hearing,  Normal vision   Respiratory: No dyspnea, cough  CV: No chest pain  GI: No abdominal pain, diarrhea, constipation, nausea, vomiting  : No dysuria, hematuria  MSK: No  edema  Heme: No easy bruising or bleeding  Psych: No significant depression    All other systems were reviewed and are negative, except as noted.    VITALS/PHYSICAL EXAM  --------------------------------------------------------------------------------  T(C): 36.6 (12-24-23 @ 08:45), Max: 36.9 (12-23-23 @ 19:55)  HR: 88 (12-24-23 @ 08:45) (60 - 98)  BP: 109/67 (12-24-23 @ 08:45) (102/62 - 131/79)  RR: 18 (12-24-23 @ 08:45) (16 - 18)  SpO2: 98% (12-24-23 @ 08:45) (97% - 99%)  Wt(kg): --  Height (cm): 177.8 (12-23-23 @ 12:39)  Weight (kg): 80.7 (12-23-23 @ 12:39)  BMI (kg/m2): 25.5 (12-23-23 @ 12:39)  BSA (m2): 1.99 (12-23-23 @ 12:39)      12-23-23 @ 07:01  -  12-24-23 @ 07:00  --------------------------------------------------------  IN: 150 mL / OUT: 550 mL / NET: -400 mL      Physical Exam:  	Gen: NAD  	HEENT: MMM  	Pulm: CTA B/L  	CV: S1S2  	Abd: Soft, +BS   	Ext: No LE edema B/L  	Neuro: Awake, alert  	Skin: Warm and dry  	Vascular access: No HD catheter           :  no jack  LABS/STUDIES  --------------------------------------------------------------------------------              10.5   11.17 >-----------<  344      [12-24-23 @ 07:21]              32.0     136  |  102  |  72  ----------------------------<  99      [12-24-23 @ 07:14]  5.5   |  19  |  3.50        Ca     9.5     [12-24-23 @ 07:14]      Mg     2.3     [12-24-23 @ 07:14]      Phos  4.1     [12-24-23 @ 07:14]    TPro  6.8  /  Alb  4.0  /  TBili  0.3  /  DBili  x   /  AST  9   /  ALT  11  /  AlkPhos  58  [12-24-23 @ 07:14]    PT/INR: PT 12.5 , INR 1.14       [12-24-23 @ 07:18]  PTT: 27.8       [12-24-23 @ 07:18]          [12-24-23 @ 07:14]    Creatinine Trend:  SCr 3.50 [12-24 @ 07:14]  SCr 3.47 [12-23 @ 15:26]    Urinalysis - [12-24-23 @ 07:14]      Color  / Appearance  / SG  / pH       Gluc 99 / Ketone   / Bili  / Urobili        Blood  / Protein  / Leuk Est  / Nitrite       RBC  / WBC  / Hyaline  / Gran  / Sq Epi  / Non Sq Epi  / Bacteria     Urine Creatinine 36      [12-23-23 @ 22:39]  Urine Protein 8      [12-23-23 @ 22:39]  Urine Sodium 63      [12-23-23 @ 22:39]  Urine Urea Nitrogen 361      [12-23-23 @ 22:39]  Urine Potassium 18      [12-23-23 @ 22:39]  Urine Osmolality 282      [12-23-23 @ 22:39]

## 2023-12-24 NOTE — PROGRESS NOTE ADULT - SUBJECTIVE AND OBJECTIVE BOX
Patient is a 68y old  Male who presents with a chief complaint of abnormal lab result (24 Dec 2023 08:34)      SUBJECTIVE / OVERNIGHT EVENTS:    Patient seen and examined. no complaints. feels well.      Vital Signs Last 24 Hrs  T(C): 36.6 (24 Dec 2023 08:45), Max: 36.9 (23 Dec 2023 19:55)  T(F): 97.9 (24 Dec 2023 08:45), Max: 98.5 (23 Dec 2023 19:55)  HR: 88 (24 Dec 2023 08:45) (60 - 98)  BP: 109/67 (24 Dec 2023 08:45) (102/62 - 131/79)  BP(mean): --  RR: 18 (24 Dec 2023 08:45) (16 - 18)  SpO2: 98% (24 Dec 2023 08:45) (97% - 99%)    Parameters below as of 24 Dec 2023 08:45  Patient On (Oxygen Delivery Method): room air      I&O's Summary    23 Dec 2023 07:01  -  24 Dec 2023 07:00  --------------------------------------------------------  IN: 150 mL / OUT: 550 mL / NET: -400 mL        PE:  GENERAL: NAD, AAOx3  CHEST/LUNG: CTABL, No wheeze, holter monitor on chest  HEART: Regular rate and rhythm; no murmur  ABDOMEN: Soft, Nontender, Nondistended; Bowel sounds present  EXTREMITIES:  2+ Peripheral Pulses, No edema  NEURO: No focal deficits    LABS:                        10.5   11.17 )-----------( 344      ( 24 Dec 2023 07:21 )             32.0     12-24    136  |  102  |  72<H>  ----------------------------<  99  5.5<H>   |  19<L>  |  3.50<H>    Ca    9.5      24 Dec 2023 07:14  Phos  4.1     12-24  Mg     2.3     12-24    TPro  6.8  /  Alb  4.0  /  TBili  0.3  /  DBili  x   /  AST  9<L>  /  ALT  11  /  AlkPhos  58  12-24    PT/INR - ( 24 Dec 2023 07:18 )   PT: 12.5 sec;   INR: 1.14 ratio         PTT - ( 24 Dec 2023 07:18 )  PTT:27.8 sec  CAPILLARY BLOOD GLUCOSE      POCT Blood Glucose.: 113 mg/dL (24 Dec 2023 08:18)  POCT Blood Glucose.: 157 mg/dL (23 Dec 2023 23:16)        Urinalysis Basic - ( 24 Dec 2023 07:14 )    Color: x / Appearance: x / SG: x / pH: x  Gluc: 99 mg/dL / Ketone: x  / Bili: x / Urobili: x   Blood: x / Protein: x / Nitrite: x   Leuk Esterase: x / RBC: x / WBC x   Sq Epi: x / Non Sq Epi: x / Bacteria: x        RADIOLOGY & ADDITIONAL TESTS:    Imaging Personally Reviewed:  [x] YES  [ ] NO    Consultant(s) Notes Reviewed:  [x] YES  [ ] NO    MEDICATIONS  (STANDING):  amLODIPine   Tablet 5 milliGRAM(s) Oral daily  atorvastatin 20 milliGRAM(s) Oral at bedtime  dextrose 5%. 1000 milliLiter(s) (100 mL/Hr) IV Continuous <Continuous>  dextrose 5%. 1000 milliLiter(s) (50 mL/Hr) IV Continuous <Continuous>  dextrose 50% Injectable 12.5 Gram(s) IV Push once  dextrose 50% Injectable 25 Gram(s) IV Push once  dextrose 50% Injectable 25 Gram(s) IV Push once  glucagon  Injectable 1 milliGRAM(s) IntraMuscular once  heparin   Injectable 5000 Unit(s) SubCutaneous every 8 hours  insulin lispro (ADMELOG) corrective regimen sliding scale   SubCutaneous three times a day before meals  insulin lispro (ADMELOG) corrective regimen sliding scale   SubCutaneous at bedtime  sodium chloride 0.9%. 1000 milliLiter(s) (75 mL/Hr) IV Continuous <Continuous>    MEDICATIONS  (PRN):  acetaminophen     Tablet .. 650 milliGRAM(s) Oral every 6 hours PRN Temp greater or equal to 38C (100.4F), Mild Pain (1 - 3)  aluminum hydroxide/magnesium hydroxide/simethicone Suspension 30 milliLiter(s) Oral every 4 hours PRN Dyspepsia  dextrose Oral Gel 15 Gram(s) Oral once PRN Blood Glucose LESS THAN 70 milliGRAM(s)/deciliter  melatonin 3 milliGRAM(s) Oral at bedtime PRN Insomnia  ondansetron Injectable 4 milliGRAM(s) IV Push every 8 hours PRN Nausea and/or Vomiting      Care Discussed with Consultants/Other Providers [x] YES  [ ] NO    HEALTH ISSUES - PROBLEM Dx:  IBAN (acute kidney injury)    DM (diabetes mellitus)    HTN (hypertension)    HLD (hyperlipidemia)

## 2023-12-24 NOTE — PATIENT PROFILE ADULT - FALL HARM RISK - UNIVERSAL INTERVENTIONS
Bed in lowest position, wheels locked, appropriate side rails in place/Call bell, personal items and telephone in reach/Instruct patient to call for assistance before getting out of bed or chair/Non-slip footwear when patient is out of bed/[place] to call system/Physically safe environment - no spills, clutter or unnecessary equipment/Purposeful Proactive Rounding/Room/bathroom lighting operational, light cord in reach Bed in lowest position, wheels locked, appropriate side rails in place/Call bell, personal items and telephone in reach/Instruct patient to call for assistance before getting out of bed or chair/Non-slip footwear when patient is out of bed/[place] to call system/Physically safe environment - no spills, clutter or unnecessary equipment/Purposeful Proactive Rounding/Room/bathroom lighting operational, light cord in reach

## 2023-12-24 NOTE — PROVIDER CONTACT NOTE (OTHER) - ASSESSMENT
pt A&Ox4, VSS, pt denies any acute signs of distress, pt states he takes lipitor in the morning and he had already taken it. Pt said he walks around and doesn't need heparin and is afraid it will make things worse unless a doctor will come speak to him in regards to his meds.

## 2023-12-24 NOTE — PROGRESS NOTE ADULT - ASSESSMENT
67yo 80kg m w pmh htn, hld, dm, p/w abnormal lab result showing worsening renal function; in er, found to have iban; admit to medicine for further mgmt     # IBAN (acute kidney injury).   unclear baseline renal function; reportedly, cr ~2.9 a few days ago; currently cr ~3.47  renal/bladder US no hydro  avoid nephrotoxic agents; appropriate dose adjustments for all renally cleared medications   IVF  nephro consult called    # DM (diabetes mellitus).   hold home regimen  follow up a1c  monitor fingerstick glu tidac + hs  low dose correctional scale lispro tidac + hs while inhouse    # HTN (hypertension)  norvasc  hold home lotensin, microzide in lieu of iban    # HLD (hyperlipidemia)  lipitor    vte ppx w subq heparin    Please call [facility] with questions [phone].   67yo 80kg m w pmh htn, hld, dm, p/w abnormal lab result showing worsening renal function; in er, found to have iban; admit to medicine for further mgmt     # IBAN (acute kidney injury).   unclear baseline renal function; reportedly, cr ~2.9 a few days ago; currently cr ~3.47  renal/bladder US no hydro  avoid nephrotoxic agents; appropriate dose adjustments for all renally cleared medications   IVF  nephro consult called    # DM (diabetes mellitus).   hold home regimen  follow up a1c  monitor fingerstick glu tidac + hs  low dose correctional scale lispro tidac + hs while inhouse    # HTN (hypertension)  norvasc  hold home lotensin, microzide in lieu of iban    # HLD (hyperlipidemia)  lipitor    vte ppx w subq heparin    Please call [facility] with questions [phone].   67yo 80kg m w pmh htn, hld, dm, p/w abnormal lab result showing worsening renal function; in er, found to have iban; admit to medicine for further mgmt     # IBAN (acute kidney injury).   unclear baseline renal function; reportedly, cr ~2.9 a few days ago; currently cr ~3.47  renal/bladder US no hydro  avoid nephrotoxic agents; appropriate dose adjustments for all renally cleared medications   IVF  nephro consult called    # DM (diabetes mellitus)  hold home regimen  follow up a1c  monitor fingerstick glu tidac + hs  low dose correctional scale lispro tidac + hs while inhouse    # HTN (hypertension)  norvasc  hold home lotensin, microzide in lieu of iban    # HLD (hyperlipidemia)  lipitor    vte ppx w subq heparin - pt refusing    Please call [facility] with questions [phone].   67yo 80kg m w pmh htn, hld, dm, p/w abnormal lab result showing worsening renal function; in er, found to have iban; admit to medicine for further mgmt     # IBAN (acute kidney injury).   unclear baseline renal function; reportedly, cr ~2.9 a few days ago; currently cr ~3.47  renal/bladder US no hydro  avoid nephrotoxic agents; appropriate dose adjustments for all renally cleared medications   IVF  nephro consult called    # DM (diabetes mellitus)  hold home regimen  follow up a1c  monitor fingerstick glu tidac + hs  low dose correctional scale lispro tidac + hs while inhouse    # HTN (hypertension)  norvasc  hold home lotensin, microzide in lieu of iban    # HLD (hyperlipidemia)  lipitor    vte ppx w subq heparin - pt refusing    Please call [facility] with questions [phone].

## 2023-12-25 LAB
ANION GAP SERPL CALC-SCNC: 15 MMOL/L — SIGNIFICANT CHANGE UP (ref 5–17)
ANION GAP SERPL CALC-SCNC: 15 MMOL/L — SIGNIFICANT CHANGE UP (ref 5–17)
BUN SERPL-MCNC: 75 MG/DL — HIGH (ref 7–23)
BUN SERPL-MCNC: 75 MG/DL — HIGH (ref 7–23)
CALCIUM SERPL-MCNC: 9.2 MG/DL — SIGNIFICANT CHANGE UP (ref 8.4–10.5)
CALCIUM SERPL-MCNC: 9.2 MG/DL — SIGNIFICANT CHANGE UP (ref 8.4–10.5)
CHLORIDE SERPL-SCNC: 104 MMOL/L — SIGNIFICANT CHANGE UP (ref 96–108)
CHLORIDE SERPL-SCNC: 104 MMOL/L — SIGNIFICANT CHANGE UP (ref 96–108)
CO2 SERPL-SCNC: 20 MMOL/L — LOW (ref 22–31)
CO2 SERPL-SCNC: 20 MMOL/L — LOW (ref 22–31)
CREAT SERPL-MCNC: 3.35 MG/DL — HIGH (ref 0.5–1.3)
CREAT SERPL-MCNC: 3.35 MG/DL — HIGH (ref 0.5–1.3)
EGFR: 19 ML/MIN/1.73M2 — LOW
EGFR: 19 ML/MIN/1.73M2 — LOW
GLUCOSE BLDC GLUCOMTR-MCNC: 102 MG/DL — HIGH (ref 70–99)
GLUCOSE BLDC GLUCOMTR-MCNC: 102 MG/DL — HIGH (ref 70–99)
GLUCOSE BLDC GLUCOMTR-MCNC: 115 MG/DL — HIGH (ref 70–99)
GLUCOSE BLDC GLUCOMTR-MCNC: 115 MG/DL — HIGH (ref 70–99)
GLUCOSE BLDC GLUCOMTR-MCNC: 117 MG/DL — HIGH (ref 70–99)
GLUCOSE BLDC GLUCOMTR-MCNC: 117 MG/DL — HIGH (ref 70–99)
GLUCOSE BLDC GLUCOMTR-MCNC: 134 MG/DL — HIGH (ref 70–99)
GLUCOSE BLDC GLUCOMTR-MCNC: 134 MG/DL — HIGH (ref 70–99)
GLUCOSE SERPL-MCNC: 104 MG/DL — HIGH (ref 70–99)
GLUCOSE SERPL-MCNC: 104 MG/DL — HIGH (ref 70–99)
HCT VFR BLD CALC: 30.1 % — LOW (ref 39–50)
HCT VFR BLD CALC: 30.1 % — LOW (ref 39–50)
HGB BLD-MCNC: 10 G/DL — LOW (ref 13–17)
HGB BLD-MCNC: 10 G/DL — LOW (ref 13–17)
MCHC RBC-ENTMCNC: 26.5 PG — LOW (ref 27–34)
MCHC RBC-ENTMCNC: 26.5 PG — LOW (ref 27–34)
MCHC RBC-ENTMCNC: 33.2 GM/DL — SIGNIFICANT CHANGE UP (ref 32–36)
MCHC RBC-ENTMCNC: 33.2 GM/DL — SIGNIFICANT CHANGE UP (ref 32–36)
MCV RBC AUTO: 79.8 FL — LOW (ref 80–100)
MCV RBC AUTO: 79.8 FL — LOW (ref 80–100)
NRBC # BLD: 0 /100 WBCS — SIGNIFICANT CHANGE UP (ref 0–0)
NRBC # BLD: 0 /100 WBCS — SIGNIFICANT CHANGE UP (ref 0–0)
PLATELET # BLD AUTO: 316 K/UL — SIGNIFICANT CHANGE UP (ref 150–400)
PLATELET # BLD AUTO: 316 K/UL — SIGNIFICANT CHANGE UP (ref 150–400)
POTASSIUM SERPL-MCNC: 4.5 MMOL/L — SIGNIFICANT CHANGE UP (ref 3.5–5.3)
POTASSIUM SERPL-MCNC: 4.5 MMOL/L — SIGNIFICANT CHANGE UP (ref 3.5–5.3)
POTASSIUM SERPL-SCNC: 4.5 MMOL/L — SIGNIFICANT CHANGE UP (ref 3.5–5.3)
POTASSIUM SERPL-SCNC: 4.5 MMOL/L — SIGNIFICANT CHANGE UP (ref 3.5–5.3)
RBC # BLD: 3.77 M/UL — LOW (ref 4.2–5.8)
RBC # BLD: 3.77 M/UL — LOW (ref 4.2–5.8)
RBC # FLD: 13.5 % — SIGNIFICANT CHANGE UP (ref 10.3–14.5)
RBC # FLD: 13.5 % — SIGNIFICANT CHANGE UP (ref 10.3–14.5)
SODIUM SERPL-SCNC: 139 MMOL/L — SIGNIFICANT CHANGE UP (ref 135–145)
SODIUM SERPL-SCNC: 139 MMOL/L — SIGNIFICANT CHANGE UP (ref 135–145)
WBC # BLD: 9.12 K/UL — SIGNIFICANT CHANGE UP (ref 3.8–10.5)
WBC # BLD: 9.12 K/UL — SIGNIFICANT CHANGE UP (ref 3.8–10.5)
WBC # FLD AUTO: 9.12 K/UL — SIGNIFICANT CHANGE UP (ref 3.8–10.5)
WBC # FLD AUTO: 9.12 K/UL — SIGNIFICANT CHANGE UP (ref 3.8–10.5)

## 2023-12-25 RX ORDER — SODIUM CHLORIDE 9 MG/ML
1000 INJECTION INTRAMUSCULAR; INTRAVENOUS; SUBCUTANEOUS
Refills: 0 | Status: DISCONTINUED | OUTPATIENT
Start: 2023-12-25 | End: 2023-12-26

## 2023-12-25 RX ADMIN — SODIUM CHLORIDE 75 MILLILITER(S): 9 INJECTION INTRAMUSCULAR; INTRAVENOUS; SUBCUTANEOUS at 10:19

## 2023-12-25 RX ADMIN — ATORVASTATIN CALCIUM 20 MILLIGRAM(S): 80 TABLET, FILM COATED ORAL at 22:13

## 2023-12-25 RX ADMIN — AMLODIPINE BESYLATE 5 MILLIGRAM(S): 2.5 TABLET ORAL at 06:28

## 2023-12-25 RX ADMIN — SODIUM CHLORIDE 75 MILLILITER(S): 9 INJECTION INTRAMUSCULAR; INTRAVENOUS; SUBCUTANEOUS at 22:14

## 2023-12-25 NOTE — PROGRESS NOTE ADULT - ASSESSMENT
67yo 80kg m w pmh htn, hld, dm, p/w abnormal lab result showing worsening renal function; in er, found to have iban; admit to medicine for further mgmt     # IBAN (acute kidney injury)  likely ATN from multiple insults  downtrending creat  renal/bladder US no hydro  avoid nephrotoxic agents; appropriate dose adjustments for all renally cleared medications   IVF  nephro following    # DM (diabetes mellitus)  hold home regimen  a1c 6.2  monitor fingerstick glu tidac + hs  low dose correctional scale lispro tidac + hs while inhouse    # HTN (hypertension)  norvasc  hold home lotensin, microzide in lieu of iban    # HLD (hyperlipidemia)  lipitor    vte ppx w subq heparin - pt refusing    Please call [facility] with questions [phone]. 67yo 80kg m w pmh htn, hld, dm, p/w abnormal lab result showing worsening renal function; in er, found to have iban; admit to medicine for further mgmt     # IBAN (acute kidney injury)  likely ATN from multiple insults  downtrending creat  renal/bladder US no hydro  avoid nephrotoxic agents; appropriate dose adjustments for all renally cleared medications   IVF  nephro following    # DM (diabetes mellitus)  hold home regimen  a1c 6.2  monitor fingerstick glu tidac + hs  low dose correctional scale lispro tidac + hs while inhouse    # HTN (hypertension)  norvasc  hold home lotensin, microzide in lieu of iban    # HLD (hyperlipidemia)  lipitor    vte ppx w subq heparin - pt refusing    Please call [facility] with questions [phone].

## 2023-12-25 NOTE — DIETITIAN INITIAL EVALUATION ADULT - OTHER INFO
dosing wt: 80.7 kg (12/23)  no [facility] wt hx to assess  reported UBW: 80.5 kg after 45 pound wt loss (~220 pound starting wt) dosing wt: 80.7 kg (12/23)  no [facility] wt hx to assess  reported UBW: 80.5 kg after 45 pound wt loss (~220 pound starting wt)

## 2023-12-25 NOTE — DIETITIAN INITIAL EVALUATION ADULT - ENERGY INTAKE
Pt reports appetite good, consuming % of most meals. Pt pleased with hospital foods. Renal menu provided to Pt with instructions on how to order meals. Renal diet principles reviewed and Pt receptive. Pt already aware of watching carbohydrate intake. Adequate (%)

## 2023-12-25 NOTE — DIETITIAN INITIAL EVALUATION ADULT - PERTINENT MEDS FT
MEDICATIONS  (STANDING):  amLODIPine   Tablet 5 milliGRAM(s) Oral daily  atorvastatin 20 milliGRAM(s) Oral at bedtime  dextrose 5%. 1000 milliLiter(s) (50 mL/Hr) IV Continuous <Continuous>  dextrose 5%. 1000 milliLiter(s) (100 mL/Hr) IV Continuous <Continuous>  dextrose 50% Injectable 25 Gram(s) IV Push once  dextrose 50% Injectable 12.5 Gram(s) IV Push once  dextrose 50% Injectable 25 Gram(s) IV Push once  glucagon  Injectable 1 milliGRAM(s) IntraMuscular once  heparin   Injectable 5000 Unit(s) SubCutaneous every 8 hours  insulin lispro (ADMELOG) corrective regimen sliding scale   SubCutaneous three times a day before meals  insulin lispro (ADMELOG) corrective regimen sliding scale   SubCutaneous at bedtime  sodium chloride 0.9%. 1000 milliLiter(s) (75 mL/Hr) IV Continuous <Continuous>    MEDICATIONS  (PRN):  acetaminophen     Tablet .. 650 milliGRAM(s) Oral every 6 hours PRN Temp greater or equal to 38C (100.4F), Mild Pain (1 - 3)  aluminum hydroxide/magnesium hydroxide/simethicone Suspension 30 milliLiter(s) Oral every 4 hours PRN Dyspepsia  dextrose Oral Gel 15 Gram(s) Oral once PRN Blood Glucose LESS THAN 70 milliGRAM(s)/deciliter  melatonin 3 milliGRAM(s) Oral at bedtime PRN Insomnia  ondansetron Injectable 4 milliGRAM(s) IV Push every 8 hours PRN Nausea and/or Vomiting

## 2023-12-25 NOTE — DIETITIAN INITIAL EVALUATION ADULT - NSFNSADHERENCEPTAFT_GEN_A_CORE
Pt following portion controlled, high protein/low carbohydrate diet PTA. HbA1c 6.2% (12/24) indicating improved glycemic control. Pt was taking Metformin PTA.

## 2023-12-25 NOTE — DIETITIAN INITIAL EVALUATION ADULT - PERTINENT LABORATORY DATA
12-25    139  |  104  |  75<H>  ----------------------------<  104<H>  4.5   |  20<L>  |  3.35<H>    Ca    9.2      25 Dec 2023 07:50  Phos  4.1     12-24  Mg     2.3     12-24    TPro  6.8  /  Alb  4.0  /  TBili  0.3  /  DBili  x   /  AST  9<L>  /  ALT  11  /  AlkPhos  58  12-24  POCT Blood Glucose.: 115 mg/dL (12-25-23 @ 08:42)  A1C with Estimated Average Glucose Result: 6.2 % (12-24-23 @ 07:18)

## 2023-12-25 NOTE — DIETITIAN INITIAL EVALUATION ADULT - EDUCATION DIETARY MODIFICATIONS
Renal diet - low potassium and phosphorus foods; general healthy diet; adequate protein and limit added sugars/salt/(2) meets goals/outcomes/verbalization

## 2023-12-25 NOTE — PROGRESS NOTE ADULT - SUBJECTIVE AND OBJECTIVE BOX
[facility] [place] [facility]   MD ARMOND BRIGGS MD RUORU WONG, PA    TEL:  FROM 9 AM to 5 PM ---OFFICE: [phone]  AVAILABLE ON TEAMS    FROM 5 PM - 9 AM PLEASE CALL ANSWERING SERVICE: [phone]    RENAL FOLLOW UP NOTE--Date of Service 12-25-23 @ 09:25  --------------------------------------------------------------------------------  HPI:      Pt seen and examined at bedside.   Denies SOB, chest pain     PAST HISTORY  --------------------------------------------------------------------------------  No significant changes to PMH, PSH, FHx, SHx, unless otherwise noted    ALLERGIES & MEDICATIONS  --------------------------------------------------------------------------------  Allergies    No Known Allergies    Intolerances      Standing Inpatient Medications  amLODIPine   Tablet 5 milliGRAM(s) Oral daily  atorvastatin 20 milliGRAM(s) Oral at bedtime  dextrose 5%. 1000 milliLiter(s) IV Continuous <Continuous>  dextrose 5%. 1000 milliLiter(s) IV Continuous <Continuous>  dextrose 50% Injectable 12.5 Gram(s) IV Push once  dextrose 50% Injectable 25 Gram(s) IV Push once  dextrose 50% Injectable 25 Gram(s) IV Push once  glucagon  Injectable 1 milliGRAM(s) IntraMuscular once  heparin   Injectable 5000 Unit(s) SubCutaneous every 8 hours  insulin lispro (ADMELOG) corrective regimen sliding scale   SubCutaneous three times a day before meals  insulin lispro (ADMELOG) corrective regimen sliding scale   SubCutaneous at bedtime  sodium bicarbonate  Infusion 0.07 mEq/kG/Hr IV Continuous <Continuous>    PRN Inpatient Medications  acetaminophen     Tablet .. 650 milliGRAM(s) Oral every 6 hours PRN  aluminum hydroxide/magnesium hydroxide/simethicone Suspension 30 milliLiter(s) Oral every 4 hours PRN  dextrose Oral Gel 15 Gram(s) Oral once PRN  melatonin 3 milliGRAM(s) Oral at bedtime PRN  ondansetron Injectable 4 milliGRAM(s) IV Push every 8 hours PRN      REVIEW OF SYSTEMS  --------------------------------------------------------------------------------  General: no fever  CVS: no chest pain  RESP: no sob, no cough  ABD: no abdominal pain  : no dysuria,  MSK: no edema     VITALS/PHYSICAL EXAM  --------------------------------------------------------------------------------  T(C): 37 (12-25-23 @ 04:15), Max: 37.2 (12-24-23 @ 20:15)  HR: 71 (12-25-23 @ 04:15) (71 - 83)  BP: 112/68 (12-25-23 @ 04:15) (99/63 - 112/68)  RR: 18 (12-25-23 @ 04:15) (18 - 20)  SpO2: 96% (12-25-23 @ 04:15) (96% - 99%)  Wt(kg): --  Height (cm): 177.8 (12-23-23 @ 12:39)  Weight (kg): 80.7 (12-23-23 @ 12:39)  BMI (kg/m2): 25.5 (12-23-23 @ 12:39)  BSA (m2): 1.99 (12-23-23 @ 12:39)      12-24-23 @ 07:01  -  12-25-23 @ 07:00  --------------------------------------------------------  IN: 720 mL / OUT: 0 mL / NET: 720 mL      Physical Exam:  	Gen: NAD  	HEENT: MMM  	Pulm: CTA B/L  	CV: S1S2  	Abd: Soft, +BS  	Ext: No LE edema B/L                      Neuro: Awake   	Skin: Warm and Dry   	Vascular access: NO HD catheter            no  jack  LABS/STUDIES  --------------------------------------------------------------------------------              10.0   9.12  >-----------<  316      [12-25-23 @ 07:50]              30.1     139  |  104  |  75  ----------------------------<  104      [12-25-23 @ 07:50]  4.5   |  20  |  3.35        Ca     9.2     [12-25-23 @ 07:50]      Mg     2.3     [12-24-23 @ 07:14]      Phos  4.1     [12-24-23 @ 07:14]    TPro  6.8  /  Alb  4.0  /  TBili  0.3  /  DBili  x   /  AST  9   /  ALT  11  /  AlkPhos  58  [12-24-23 @ 07:14]    PT/INR: PT 12.5 , INR 1.14       [12-24-23 @ 07:18]  PTT: 27.8       [12-24-23 @ 07:18]          [12-24-23 @ 07:14]    Creatinine Trend:  SCr 3.35 [12-25 @ 07:50]  SCr 3.39 [12-24 @ 15:36]  SCr 3.50 [12-24 @ 07:14]  SCr 3.47 [12-23 @ 15:26]    Urinalysis - [12-25-23 @ 07:50]      Color  / Appearance  / SG  / pH       Gluc 104 / Ketone   / Bili  / Urobili        Blood  / Protein  / Leuk Est  / Nitrite       RBC  / WBC  / Hyaline  / Gran  / Sq Epi  / Non Sq Epi  / Bacteria     Urine Creatinine 36      [12-23-23 @ 22:39]  Urine Protein 8      [12-23-23 @ 22:39]  Urine Sodium 63      [12-23-23 @ 22:39]  Urine Urea Nitrogen 361      [12-23-23 @ 22:39]  Urine Potassium 18      [12-23-23 @ 22:39]  Urine Osmolality 282      [12-23-23 @ 22:39]    Iron 35, TIBC 247, %sat 14      [12-24-23 @ 07:18]  Ferritin 384      [12-24-23 @ 07:18]  PTH -- (Ca 8.8)      [12-24-23 @ 07:18]   49  Vitamin D (25OH) 46.5      [12-24-23 @ 07:18]  TSH 2.16      [12-24-23 @ 07:18]  Lipid: chol 92, TG 91, HDL 32, LDL --      [12-24-23 @ 07:18]    HCV 0.07, Nonreact      [12-24-23 @ 07:18]     [facility]   MD ARMOND BRIGGS MD RUORU WONG, PA    TEL:  FROM 9 AM to 5 PM ---OFFICE: [phone]  AVAILABLE ON TEAMS    FROM 5 PM - 9 AM PLEASE CALL ANSWERING SERVICE: [phone]    RENAL FOLLOW UP NOTE--Date of Service 12-25-23 @ 09:25  --------------------------------------------------------------------------------  HPI:      Pt seen and examined at bedside.   Denies SOB, chest pain     PAST HISTORY  --------------------------------------------------------------------------------  No significant changes to PMH, PSH, FHx, SHx, unless otherwise noted    ALLERGIES & MEDICATIONS  --------------------------------------------------------------------------------  Allergies    No Known Allergies    Intolerances      Standing Inpatient Medications  amLODIPine   Tablet 5 milliGRAM(s) Oral daily  atorvastatin 20 milliGRAM(s) Oral at bedtime  dextrose 5%. 1000 milliLiter(s) IV Continuous <Continuous>  dextrose 5%. 1000 milliLiter(s) IV Continuous <Continuous>  dextrose 50% Injectable 12.5 Gram(s) IV Push once  dextrose 50% Injectable 25 Gram(s) IV Push once  dextrose 50% Injectable 25 Gram(s) IV Push once  glucagon  Injectable 1 milliGRAM(s) IntraMuscular once  heparin   Injectable 5000 Unit(s) SubCutaneous every 8 hours  insulin lispro (ADMELOG) corrective regimen sliding scale   SubCutaneous three times a day before meals  insulin lispro (ADMELOG) corrective regimen sliding scale   SubCutaneous at bedtime  sodium bicarbonate  Infusion 0.07 mEq/kG/Hr IV Continuous <Continuous>    PRN Inpatient Medications  acetaminophen     Tablet .. 650 milliGRAM(s) Oral every 6 hours PRN  aluminum hydroxide/magnesium hydroxide/simethicone Suspension 30 milliLiter(s) Oral every 4 hours PRN  dextrose Oral Gel 15 Gram(s) Oral once PRN  melatonin 3 milliGRAM(s) Oral at bedtime PRN  ondansetron Injectable 4 milliGRAM(s) IV Push every 8 hours PRN      REVIEW OF SYSTEMS  --------------------------------------------------------------------------------  General: no fever  CVS: no chest pain  RESP: no sob, no cough  ABD: no abdominal pain  : no dysuria,  MSK: no edema     VITALS/PHYSICAL EXAM  --------------------------------------------------------------------------------  T(C): 37 (12-25-23 @ 04:15), Max: 37.2 (12-24-23 @ 20:15)  HR: 71 (12-25-23 @ 04:15) (71 - 83)  BP: 112/68 (12-25-23 @ 04:15) (99/63 - 112/68)  RR: 18 (12-25-23 @ 04:15) (18 - 20)  SpO2: 96% (12-25-23 @ 04:15) (96% - 99%)  Wt(kg): --  Height (cm): 177.8 (12-23-23 @ 12:39)  Weight (kg): 80.7 (12-23-23 @ 12:39)  BMI (kg/m2): 25.5 (12-23-23 @ 12:39)  BSA (m2): 1.99 (12-23-23 @ 12:39)      12-24-23 @ 07:01  -  12-25-23 @ 07:00  --------------------------------------------------------  IN: 720 mL / OUT: 0 mL / NET: 720 mL      Physical Exam:  	Gen: NAD  	HEENT: MMM  	Pulm: CTA B/L  	CV: S1S2  	Abd: Soft, +BS  	Ext: No LE edema B/L                      Neuro: Awake   	Skin: Warm and Dry   	Vascular access: NO HD catheter            no  jack  LABS/STUDIES  --------------------------------------------------------------------------------              10.0   9.12  >-----------<  316      [12-25-23 @ 07:50]              30.1     139  |  104  |  75  ----------------------------<  104      [12-25-23 @ 07:50]  4.5   |  20  |  3.35        Ca     9.2     [12-25-23 @ 07:50]      Mg     2.3     [12-24-23 @ 07:14]      Phos  4.1     [12-24-23 @ 07:14]    TPro  6.8  /  Alb  4.0  /  TBili  0.3  /  DBili  x   /  AST  9   /  ALT  11  /  AlkPhos  58  [12-24-23 @ 07:14]    PT/INR: PT 12.5 , INR 1.14       [12-24-23 @ 07:18]  PTT: 27.8       [12-24-23 @ 07:18]          [12-24-23 @ 07:14]    Creatinine Trend:  SCr 3.35 [12-25 @ 07:50]  SCr 3.39 [12-24 @ 15:36]  SCr 3.50 [12-24 @ 07:14]  SCr 3.47 [12-23 @ 15:26]    Urinalysis - [12-25-23 @ 07:50]      Color  / Appearance  / SG  / pH       Gluc 104 / Ketone   / Bili  / Urobili        Blood  / Protein  / Leuk Est  / Nitrite       RBC  / WBC  / Hyaline  / Gran  / Sq Epi  / Non Sq Epi  / Bacteria     Urine Creatinine 36      [12-23-23 @ 22:39]  Urine Protein 8      [12-23-23 @ 22:39]  Urine Sodium 63      [12-23-23 @ 22:39]  Urine Urea Nitrogen 361      [12-23-23 @ 22:39]  Urine Potassium 18      [12-23-23 @ 22:39]  Urine Osmolality 282      [12-23-23 @ 22:39]    Iron 35, TIBC 247, %sat 14      [12-24-23 @ 07:18]  Ferritin 384      [12-24-23 @ 07:18]  PTH -- (Ca 8.8)      [12-24-23 @ 07:18]   49  Vitamin D (25OH) 46.5      [12-24-23 @ 07:18]  TSH 2.16      [12-24-23 @ 07:18]  Lipid: chol 92, TG 91, HDL 32, LDL --      [12-24-23 @ 07:18]    HCV 0.07, Nonreact      [12-24-23 @ 07:18]

## 2023-12-25 NOTE — DIETITIAN INITIAL EVALUATION ADULT - NS FNS DIET ORDER
Diet, Regular:   Consistent Carbohydrate {No Snacks} (CSTCHO)  For patients receiving Renal Replacement - No Protein Restr, No Conc K, No Conc Phos, Low Sodium (RENAL) (12-24-23 @ 13:04)

## 2023-12-25 NOTE — DIETITIAN INITIAL EVALUATION ADULT - ORAL INTAKE PTA/DIET HISTORY
Chart reviewed, events noted. Pt reports good appetite. No issues chewing/swallowing. NKFA. Pt reports following a diet since 11/2022 (based on [place] Diet - "The Journey") with 45 pound intentional wt loss. Pt attends weekly zoom weight management group meetings. Has maintained wt at 177 pounds x last 3 months. Diet consists of : 2 slices light toast, cottage cheese, and cucumbers for breakfast, 3% or 5% plain Fage yogurt for snack 2x/day with fruit, protein and salad for lunch, and protein, salad, vegetables for dinner. Able to use 1 Tbsp olive oil per day. Chart reviewed, events noted. Pt reports good appetite. No issues chewing/swallowing. NKFA. Pt reports following a diet since 11/2022 (based on [place] Diet - "The Journey") with 45 pound intentional wt loss. Pt attends weekly zoom weight management group meetings. Has maintained wt at 177 pounds x last 3 months. Diet consists of : 2 slices light toast, cottage cheese, and cucumbers for breakfast, 3% or 5% plain Fage yogurt for snack 2x/day with fruit, protein and salad for lunch, and protein, salad, vegetables for dinner. Able to use 1 Tbsp olive oil per day.

## 2023-12-25 NOTE — DIETITIAN INITIAL EVALUATION ADULT - REASON FOR ADMISSION
Per chart, Pt is a 69 y/o M with PMH: "HTN, DM presents with elevated Cr on outpatient labs. Patient endorses low grade fevers 1+ weeks, had an episode of syncope Thursday, went to PCP and was found to have elevated Cr, that jose from 2.9 to 3.5 in one day. DX: Acute renal failure CR 3.47, on IVF, nephro following."

## 2023-12-25 NOTE — PROGRESS NOTE ADULT - SUBJECTIVE AND OBJECTIVE BOX
Patient is a 68y old  Male who presents with a chief complaint of abnormal lab result (24 Dec 2023 09:29)      SUBJECTIVE / OVERNIGHT EVENTS:    Patient seen and examined. no complaints. no acute events.      Vital Signs Last 24 Hrs  T(C): 37 (25 Dec 2023 04:15), Max: 37.2 (24 Dec 2023 20:15)  T(F): 98.6 (25 Dec 2023 04:15), Max: 98.9 (24 Dec 2023 20:15)  HR: 71 (25 Dec 2023 04:15) (71 - 88)  BP: 112/68 (25 Dec 2023 04:15) (99/63 - 112/68)  BP(mean): --  RR: 18 (25 Dec 2023 04:15) (18 - 20)  SpO2: 96% (25 Dec 2023 04:15) (96% - 99%)    Parameters below as of 25 Dec 2023 04:15  Patient On (Oxygen Delivery Method): room air      I&O's Summary    24 Dec 2023 07:01  -  25 Dec 2023 07:00  --------------------------------------------------------  IN: 720 mL / OUT: 0 mL / NET: 720 mL        PE:  GENERAL: NAD, AAOx3  CHEST/LUNG: CTABL, No wheeze  HEART: Regular rate and rhythm; no murmur  ABDOMEN: Soft, Nontender, Nondistended; Bowel sounds present  EXTREMITIES:  2+ Peripheral Pulses, No edema  NEURO: No focal deficits    LABS:                        10.5   11.17 )-----------( 344      ( 24 Dec 2023 07:21 )             32.0     12-24    136  |  102  |  75<H>  ----------------------------<  109<H>  5.0   |  23  |  3.39<H>    Ca    9.4      24 Dec 2023 15:36  Phos  4.1     12-24  Mg     2.3     12-24    TPro  6.8  /  Alb  4.0  /  TBili  0.3  /  DBili  x   /  AST  9<L>  /  ALT  11  /  AlkPhos  58  12-24    PT/INR - ( 24 Dec 2023 07:18 )   PT: 12.5 sec;   INR: 1.14 ratio         PTT - ( 24 Dec 2023 07:18 )  PTT:27.8 sec  CAPILLARY BLOOD GLUCOSE      POCT Blood Glucose.: 132 mg/dL (24 Dec 2023 21:09)  POCT Blood Glucose.: 110 mg/dL (24 Dec 2023 17:01)  POCT Blood Glucose.: 184 mg/dL (24 Dec 2023 11:27)  POCT Blood Glucose.: 113 mg/dL (24 Dec 2023 08:18)        Urinalysis Basic - ( 24 Dec 2023 15:36 )    Color: x / Appearance: x / SG: x / pH: x  Gluc: 109 mg/dL / Ketone: x  / Bili: x / Urobili: x   Blood: x / Protein: x / Nitrite: x   Leuk Esterase: x / RBC: x / WBC x   Sq Epi: x / Non Sq Epi: x / Bacteria: x        RADIOLOGY & ADDITIONAL TESTS:    Imaging Personally Reviewed:  [x] YES  [ ] NO    Consultant(s) Notes Reviewed:  [x] YES  [ ] NO    MEDICATIONS  (STANDING):  amLODIPine   Tablet 5 milliGRAM(s) Oral daily  atorvastatin 20 milliGRAM(s) Oral at bedtime  dextrose 5%. 1000 milliLiter(s) (100 mL/Hr) IV Continuous <Continuous>  dextrose 5%. 1000 milliLiter(s) (50 mL/Hr) IV Continuous <Continuous>  dextrose 50% Injectable 12.5 Gram(s) IV Push once  dextrose 50% Injectable 25 Gram(s) IV Push once  dextrose 50% Injectable 25 Gram(s) IV Push once  glucagon  Injectable 1 milliGRAM(s) IntraMuscular once  heparin   Injectable 5000 Unit(s) SubCutaneous every 8 hours  insulin lispro (ADMELOG) corrective regimen sliding scale   SubCutaneous three times a day before meals  insulin lispro (ADMELOG) corrective regimen sliding scale   SubCutaneous at bedtime  sodium bicarbonate  Infusion 0.07 mEq/kG/Hr (75 mL/Hr) IV Continuous <Continuous>    MEDICATIONS  (PRN):  acetaminophen     Tablet .. 650 milliGRAM(s) Oral every 6 hours PRN Temp greater or equal to 38C (100.4F), Mild Pain (1 - 3)  aluminum hydroxide/magnesium hydroxide/simethicone Suspension 30 milliLiter(s) Oral every 4 hours PRN Dyspepsia  dextrose Oral Gel 15 Gram(s) Oral once PRN Blood Glucose LESS THAN 70 milliGRAM(s)/deciliter  melatonin 3 milliGRAM(s) Oral at bedtime PRN Insomnia  ondansetron Injectable 4 milliGRAM(s) IV Push every 8 hours PRN Nausea and/or Vomiting      Care Discussed with Consultants/Other Providers [x] YES  [ ] NO    HEALTH ISSUES - PROBLEM Dx:  IBAN (acute kidney injury)    DM (diabetes mellitus)    HTN (hypertension)    HLD (hyperlipidemia)

## 2023-12-25 NOTE — DIETITIAN INITIAL EVALUATION ADULT - ADD RECOMMEND
1) continue Renal, Consistent Carbohydrate diet  2) reinforce diet education at f/u and PRN  3) IVF per Renal team  4) Monitor PO intake, weight, labs, skin, GI status, diet

## 2023-12-25 NOTE — PROGRESS NOTE ADULT - ASSESSMENT
67yo 80kg m w pmh htn, hld, dm, p/w abnormal lab result showing worsening renal function; in    IBAN  baseline scr unknown, per pt no prior history of kidney disease  iban likely sec to nsaid use/ hctz/ ace and hypotensive episode --  renal sonogram with no hydro  check bladder scan to rule out retention   urine lytes inconclusive  Ua with no protein no rbc  continue gentle hydration today --change to NS at 75cc  monitor closely   avoid nephrotoxics, nsaids rca    hyperkalemia  sec to RF  no retention   improved   low k diet   monitor closely     HTN  BP stable  monitor     acidosis  improved   monitor serum co2         69yo 80kg m w pmh htn, hld, dm, p/w abnormal lab result showing worsening renal function; in    IBAN  baseline scr unknown, per pt no prior history of kidney disease  iban likely sec to nsaid use/ hctz/ ace and hypotensive episode --  renal sonogram with no hydro  check bladder scan to rule out retention   urine lytes inconclusive  Ua with no protein no rbc  continue gentle hydration today --change to NS at 75cc  monitor closely   avoid nephrotoxics, nsaids rca    hyperkalemia  sec to RF  no retention   improved   low k diet   monitor closely     HTN  BP stable  monitor     acidosis  improved   monitor serum co2

## 2023-12-26 LAB
ANION GAP SERPL CALC-SCNC: 14 MMOL/L — SIGNIFICANT CHANGE UP (ref 5–17)
ANION GAP SERPL CALC-SCNC: 14 MMOL/L — SIGNIFICANT CHANGE UP (ref 5–17)
BUN SERPL-MCNC: 68 MG/DL — HIGH (ref 7–23)
BUN SERPL-MCNC: 68 MG/DL — HIGH (ref 7–23)
CALCIUM SERPL-MCNC: 8.9 MG/DL — SIGNIFICANT CHANGE UP (ref 8.4–10.5)
CALCIUM SERPL-MCNC: 8.9 MG/DL — SIGNIFICANT CHANGE UP (ref 8.4–10.5)
CHLORIDE SERPL-SCNC: 106 MMOL/L — SIGNIFICANT CHANGE UP (ref 96–108)
CHLORIDE SERPL-SCNC: 106 MMOL/L — SIGNIFICANT CHANGE UP (ref 96–108)
CO2 SERPL-SCNC: 19 MMOL/L — LOW (ref 22–31)
CO2 SERPL-SCNC: 19 MMOL/L — LOW (ref 22–31)
CREAT SERPL-MCNC: 2.96 MG/DL — HIGH (ref 0.5–1.3)
CREAT SERPL-MCNC: 2.96 MG/DL — HIGH (ref 0.5–1.3)
EGFR: 22 ML/MIN/1.73M2 — LOW
EGFR: 22 ML/MIN/1.73M2 — LOW
GLUCOSE BLDC GLUCOMTR-MCNC: 100 MG/DL — HIGH (ref 70–99)
GLUCOSE BLDC GLUCOMTR-MCNC: 100 MG/DL — HIGH (ref 70–99)
GLUCOSE BLDC GLUCOMTR-MCNC: 111 MG/DL — HIGH (ref 70–99)
GLUCOSE BLDC GLUCOMTR-MCNC: 111 MG/DL — HIGH (ref 70–99)
GLUCOSE BLDC GLUCOMTR-MCNC: 125 MG/DL — HIGH (ref 70–99)
GLUCOSE BLDC GLUCOMTR-MCNC: 125 MG/DL — HIGH (ref 70–99)
GLUCOSE BLDC GLUCOMTR-MCNC: 160 MG/DL — HIGH (ref 70–99)
GLUCOSE BLDC GLUCOMTR-MCNC: 160 MG/DL — HIGH (ref 70–99)
GLUCOSE SERPL-MCNC: 113 MG/DL — HIGH (ref 70–99)
GLUCOSE SERPL-MCNC: 113 MG/DL — HIGH (ref 70–99)
HCT VFR BLD CALC: 31.6 % — LOW (ref 39–50)
HCT VFR BLD CALC: 31.6 % — LOW (ref 39–50)
HGB BLD-MCNC: 10.3 G/DL — LOW (ref 13–17)
HGB BLD-MCNC: 10.3 G/DL — LOW (ref 13–17)
MCHC RBC-ENTMCNC: 26.7 PG — LOW (ref 27–34)
MCHC RBC-ENTMCNC: 26.7 PG — LOW (ref 27–34)
MCHC RBC-ENTMCNC: 32.6 GM/DL — SIGNIFICANT CHANGE UP (ref 32–36)
MCHC RBC-ENTMCNC: 32.6 GM/DL — SIGNIFICANT CHANGE UP (ref 32–36)
MCV RBC AUTO: 81.9 FL — SIGNIFICANT CHANGE UP (ref 80–100)
MCV RBC AUTO: 81.9 FL — SIGNIFICANT CHANGE UP (ref 80–100)
NRBC # BLD: 0 /100 WBCS — SIGNIFICANT CHANGE UP (ref 0–0)
NRBC # BLD: 0 /100 WBCS — SIGNIFICANT CHANGE UP (ref 0–0)
PLATELET # BLD AUTO: 328 K/UL — SIGNIFICANT CHANGE UP (ref 150–400)
PLATELET # BLD AUTO: 328 K/UL — SIGNIFICANT CHANGE UP (ref 150–400)
POTASSIUM SERPL-MCNC: 4.7 MMOL/L — SIGNIFICANT CHANGE UP (ref 3.5–5.3)
POTASSIUM SERPL-MCNC: 4.7 MMOL/L — SIGNIFICANT CHANGE UP (ref 3.5–5.3)
POTASSIUM SERPL-SCNC: 4.7 MMOL/L — SIGNIFICANT CHANGE UP (ref 3.5–5.3)
POTASSIUM SERPL-SCNC: 4.7 MMOL/L — SIGNIFICANT CHANGE UP (ref 3.5–5.3)
RBC # BLD: 3.86 M/UL — LOW (ref 4.2–5.8)
RBC # BLD: 3.86 M/UL — LOW (ref 4.2–5.8)
RBC # FLD: 13.7 % — SIGNIFICANT CHANGE UP (ref 10.3–14.5)
RBC # FLD: 13.7 % — SIGNIFICANT CHANGE UP (ref 10.3–14.5)
SODIUM SERPL-SCNC: 139 MMOL/L — SIGNIFICANT CHANGE UP (ref 135–145)
SODIUM SERPL-SCNC: 139 MMOL/L — SIGNIFICANT CHANGE UP (ref 135–145)
WBC # BLD: 9.41 K/UL — SIGNIFICANT CHANGE UP (ref 3.8–10.5)
WBC # BLD: 9.41 K/UL — SIGNIFICANT CHANGE UP (ref 3.8–10.5)
WBC # FLD AUTO: 9.41 K/UL — SIGNIFICANT CHANGE UP (ref 3.8–10.5)
WBC # FLD AUTO: 9.41 K/UL — SIGNIFICANT CHANGE UP (ref 3.8–10.5)

## 2023-12-26 RX ORDER — SODIUM BICARBONATE 1 MEQ/ML
0.07 SYRINGE (ML) INTRAVENOUS
Qty: 75 | Refills: 0 | Status: DISCONTINUED | OUTPATIENT
Start: 2023-12-26 | End: 2023-12-27

## 2023-12-26 RX ADMIN — ATORVASTATIN CALCIUM 20 MILLIGRAM(S): 80 TABLET, FILM COATED ORAL at 22:41

## 2023-12-26 RX ADMIN — AMLODIPINE BESYLATE 5 MILLIGRAM(S): 2.5 TABLET ORAL at 06:50

## 2023-12-26 RX ADMIN — Medication 75 MEQ/KG/HR: at 12:12

## 2023-12-26 NOTE — PROGRESS NOTE ADULT - ASSESSMENT
69yo 80kg m w pmh htn, hld, dm, p/w abnormal lab result showing worsening renal function; in    IBAN  baseline scr unknown, per pt no prior history of kidney disease  iban likely sec to nsaid use/ hctz/ ace and hypotensive episode --  renal sonogram with no hydro  check bladder scan to rule out retention   urine lytes inconclusive  Ua with no protein no rbc  continue gentle hydration today --change to 1/2ns 75meq NaCO3 75cc/hr  monitor closely   avoid nephrotoxics, nsaids rca    hyperkalemia  sec to RF  no retention   improved   low k diet   monitor closely     HTN  BP stable  monitor     acidosis  Slightly worsening- Fluids with bicarb as above  monitor serum co2         67yo 80kg m w pmh htn, hld, dm, p/w abnormal lab result showing worsening renal function; in    IBAN  baseline scr unknown, per pt no prior history of kidney disease  iban likely sec to nsaid use/ hctz/ ace and hypotensive episode --  renal sonogram with no hydro  check bladder scan to rule out retention   urine lytes inconclusive  Ua with no protein no rbc  continue gentle hydration today --change to 1/2ns 75meq NaCO3 75cc/hr  monitor closely   avoid nephrotoxics, nsaids rca    hyperkalemia  sec to RF  no retention   improved   low k diet   monitor closely     HTN  BP stable  monitor     acidosis  Slightly worsening- Fluids with bicarb as above  monitor serum co2

## 2023-12-26 NOTE — PROGRESS NOTE ADULT - SUBJECTIVE AND OBJECTIVE BOX
Patient is a 68y old  Male who presents with a chief complaint of iban    SUBJECTIVE / OVERNIGHT EVENTS:    Patient seen and examined. no complaints. no acute events.      Vital Signs Last 24 Hrs  T(C): 37 (26 Dec 2023 04:41), Max: 37 (26 Dec 2023 04:41)  T(F): 98.6 (26 Dec 2023 04:41), Max: 98.6 (26 Dec 2023 04:41)  HR: 78 (26 Dec 2023 04:41) (73 - 79)  BP: 111/68 (26 Dec 2023 04:41) (105/69 - 112/67)  BP(mean): --  RR: 18 (26 Dec 2023 04:41) (18 - 18)  SpO2: 97% (26 Dec 2023 04:41) (97% - 100%)    Parameters below as of 26 Dec 2023 04:41  Patient On (Oxygen Delivery Method): room air      I&O's Summary    25 Dec 2023 07:01  -  26 Dec 2023 07:00  --------------------------------------------------------  IN: 300 mL / OUT: 0 mL / NET: 300 mL        PE:  GENERAL: NAD, AAOx3  CHEST/LUNG: CTABL, No wheeze  HEART: Regular rate and rhythm; no murmur  ABDOMEN: Soft, Nontender, Nondistended; Bowel sounds present  EXTREMITIES:  2+ Peripheral Pulses, No edema  NEURO: No focal deficits    LABS:                        10.3   9.41  )-----------( 328      ( 26 Dec 2023 07:29 )             31.6     12-26    139  |  106  |  68<H>  ----------------------------<  113<H>  4.7   |  19<L>  |  2.96<H>    Ca    8.9      26 Dec 2023 07:29        CAPILLARY BLOOD GLUCOSE      POCT Blood Glucose.: 125 mg/dL (26 Dec 2023 08:50)  POCT Blood Glucose.: 134 mg/dL (25 Dec 2023 21:11)  POCT Blood Glucose.: 102 mg/dL (25 Dec 2023 17:13)  POCT Blood Glucose.: 117 mg/dL (25 Dec 2023 12:23)        Urinalysis Basic - ( 26 Dec 2023 07:29 )    Color: x / Appearance: x / SG: x / pH: x  Gluc: 113 mg/dL / Ketone: x  / Bili: x / Urobili: x   Blood: x / Protein: x / Nitrite: x   Leuk Esterase: x / RBC: x / WBC x   Sq Epi: x / Non Sq Epi: x / Bacteria: x        RADIOLOGY & ADDITIONAL TESTS:    Imaging Personally Reviewed:  [x] YES  [ ] NO    Consultant(s) Notes Reviewed:  [x] YES  [ ] NO    MEDICATIONS  (STANDING):  amLODIPine   Tablet 5 milliGRAM(s) Oral daily  atorvastatin 20 milliGRAM(s) Oral at bedtime  dextrose 5%. 1000 milliLiter(s) (50 mL/Hr) IV Continuous <Continuous>  dextrose 5%. 1000 milliLiter(s) (100 mL/Hr) IV Continuous <Continuous>  dextrose 50% Injectable 12.5 Gram(s) IV Push once  dextrose 50% Injectable 25 Gram(s) IV Push once  dextrose 50% Injectable 25 Gram(s) IV Push once  glucagon  Injectable 1 milliGRAM(s) IntraMuscular once  heparin   Injectable 5000 Unit(s) SubCutaneous every 8 hours  insulin lispro (ADMELOG) corrective regimen sliding scale   SubCutaneous three times a day before meals  insulin lispro (ADMELOG) corrective regimen sliding scale   SubCutaneous at bedtime  sodium bicarbonate  Infusion 0.07 mEq/kG/Hr (75 mL/Hr) IV Continuous <Continuous>    MEDICATIONS  (PRN):  acetaminophen     Tablet .. 650 milliGRAM(s) Oral every 6 hours PRN Temp greater or equal to 38C (100.4F), Mild Pain (1 - 3)  aluminum hydroxide/magnesium hydroxide/simethicone Suspension 30 milliLiter(s) Oral every 4 hours PRN Dyspepsia  dextrose Oral Gel 15 Gram(s) Oral once PRN Blood Glucose LESS THAN 70 milliGRAM(s)/deciliter  melatonin 3 milliGRAM(s) Oral at bedtime PRN Insomnia  ondansetron Injectable 4 milliGRAM(s) IV Push every 8 hours PRN Nausea and/or Vomiting      Care Discussed with Consultants/Other Providers [x] YES  [ ] NO    HEALTH ISSUES - PROBLEM Dx:  IBAN (acute kidney injury)    DM (diabetes mellitus)    HTN (hypertension)    HLD (hyperlipidemia)

## 2023-12-26 NOTE — PROGRESS NOTE ADULT - ASSESSMENT
69yo 80kg m w pmh htn, hld, dm, p/w abnormal lab result showing worsening renal function; in er, found to have iban; admit to medicine for further mgmt.    # IBAN (acute kidney injury)  likely ATN from multiple insults  downtrending creat  renal/bladder US no hydro  sodium bicarb per renal  nephro following    # DM (diabetes mellitus)  hold home regimen  a1c 6.2  monitor fingerstick glu tidac + hs  low dose correctional scale lispro tidac + hs while inhouse    # HTN (hypertension)  norvasc  hold home lotensin, microzide in lieu of iban    # HLD (hyperlipidemia)  lipitor    vte ppx w subq heparin - pt refusing    dc planning 24-48hrs pending improvement in creat    Dr Edgar will be covering me starting tomorrow.  Please contact with any questions or concerns [phone].   69yo 80kg m w pmh htn, hld, dm, p/w abnormal lab result showing worsening renal function; in er, found to have iban; admit to medicine for further mgmt.    # IBAN (acute kidney injury)  likely ATN from multiple insults  downtrending creat  renal/bladder US no hydro  sodium bicarb per renal  nephro following    # DM (diabetes mellitus)  hold home regimen  a1c 6.2  monitor fingerstick glu tidac + hs  low dose correctional scale lispro tidac + hs while inhouse    # HTN (hypertension)  norvasc  hold home lotensin, microzide in lieu of iban    # HLD (hyperlipidemia)  lipitor    vte ppx w subq heparin - pt refusing    dc planning 24-48hrs pending improvement in creat    Dr Edgar will be covering me starting tomorrow.  Please contact with any questions or concerns [phone].

## 2023-12-26 NOTE — PROGRESS NOTE ADULT - SUBJECTIVE AND OBJECTIVE BOX
[facility] [place] [facility]   MD ARMOND BRIGGS MD ANGELA WONG, BRANDT DICKERSON NP    TEL:  OFFICE: [phone]  From 5pm-7am Answering Service [phone]    -- RENAL FOLLOW UP NOTE ---Date of Service 12-26-23 @ 14:18    Patient is a 68y old  Male who presents with a chief complaint of abnormal lab result (26 Dec 2023 10:44)      Patient seen and examined at bedside. No chest pain/sob    VITALS:  T(F): 97.9 (12-26-23 @ 12:06), Max: 98.6 (12-26-23 @ 04:41)  HR: 71 (12-26-23 @ 12:06)  BP: 105/66 (12-26-23 @ 12:06)  RR: 18 (12-26-23 @ 12:06)  SpO2: 97% (12-26-23 @ 12:06)  Wt(kg): --    12-25 @ 07:01  -  12-26 @ 07:00  --------------------------------------------------------  IN: 300 mL / OUT: 0 mL / NET: 300 mL    12-26 @ 07:01  -  12-26 @ 14:18  --------------------------------------------------------  IN: 200 mL / OUT: 0 mL / NET: 200 mL          PHYSICAL EXAM:  General: NAD  Neck: No JVD  Respiratory: CTAB, no wheezes, rales or rhonchi  Cardiovascular: S1, S2, RRR  Gastrointestinal: BS+, soft, NT/ND  Extremities: No peripheral edema    Hospital Medications:   MEDICATIONS  (STANDING):  amLODIPine   Tablet 5 milliGRAM(s) Oral daily  atorvastatin 20 milliGRAM(s) Oral at bedtime  dextrose 5%. 1000 milliLiter(s) (50 mL/Hr) IV Continuous <Continuous>  dextrose 5%. 1000 milliLiter(s) (100 mL/Hr) IV Continuous <Continuous>  dextrose 50% Injectable 12.5 Gram(s) IV Push once  dextrose 50% Injectable 25 Gram(s) IV Push once  dextrose 50% Injectable 25 Gram(s) IV Push once  glucagon  Injectable 1 milliGRAM(s) IntraMuscular once  heparin   Injectable 5000 Unit(s) SubCutaneous every 8 hours  insulin lispro (ADMELOG) corrective regimen sliding scale   SubCutaneous three times a day before meals  insulin lispro (ADMELOG) corrective regimen sliding scale   SubCutaneous at bedtime  sodium bicarbonate  Infusion 0.07 mEq/kG/Hr (75 mL/Hr) IV Continuous <Continuous>      LABS:  12-26    139  |  106  |  68<H>  ----------------------------<  113<H>  4.7   |  19<L>  |  2.96<H>    Ca    8.9      26 Dec 2023 07:29      Creatinine Trend: 2.96 <--, 3.35 <--, 3.39 <--, 3.50 <--, 3.47 <--                                10.3   9.41  )-----------( 328      ( 26 Dec 2023 07:29 )             31.6     Urine Studies:  Urinalysis - [12-26-23 @ 07:29]      Color  / Appearance  / SG  / pH       Gluc 113 / Ketone   / Bili  / Urobili        Blood  / Protein  / Leuk Est  / Nitrite       RBC  / WBC  / Hyaline  / Gran  / Sq Epi  / Non Sq Epi  / Bacteria     Urine Creatinine 36      [12-23-23 @ 22:39]  Urine Protein 8      [12-23-23 @ 22:39]  Urine Sodium 63      [12-23-23 @ 22:39]  Urine Urea Nitrogen 361      [12-23-23 @ 22:39]  Urine Potassium 18      [12-23-23 @ 22:39]  Urine Osmolality 282      [12-23-23 @ 22:39]    Iron 35, TIBC 247, %sat 14      [12-24-23 @ 07:18]  Ferritin 384      [12-24-23 @ 07:18]  PTH -- (Ca 8.8)      [12-24-23 @ 07:18]   49  Vitamin D (25OH) 46.5      [12-24-23 @ 07:18]  TSH 2.16      [12-24-23 @ 07:18]  Lipid: chol 92, TG 91, HDL 32, LDL --      [12-24-23 @ 07:18]    HCV 0.07, Nonreact      [12-24-23 @ 07:18]      RADIOLOGY & ADDITIONAL STUDIES:   [facility] [place] [facility] [place] [facility]   MD ARMOND BRIGGS MD ANGELA WONG, BRANDT DICKERSON NP    TEL:  OFFICE: [phone]  From 5pm-7am Answering Service [phone]    -- RENAL FOLLOW UP NOTE ---Date of Service 12-26-23 @ 14:18    Patient is a 68y old  Male who presents with a chief complaint of abnormal lab result (26 Dec 2023 10:44)      Patient seen and examined at bedside. No chest pain/sob    VITALS:  T(F): 97.9 (12-26-23 @ 12:06), Max: 98.6 (12-26-23 @ 04:41)  HR: 71 (12-26-23 @ 12:06)  BP: 105/66 (12-26-23 @ 12:06)  RR: 18 (12-26-23 @ 12:06)  SpO2: 97% (12-26-23 @ 12:06)  Wt(kg): --    12-25 @ 07:01  -  12-26 @ 07:00  --------------------------------------------------------  IN: 300 mL / OUT: 0 mL / NET: 300 mL    12-26 @ 07:01  -  12-26 @ 14:18  --------------------------------------------------------  IN: 200 mL / OUT: 0 mL / NET: 200 mL          PHYSICAL EXAM:  General: NAD  Neck: No JVD  Respiratory: CTAB, no wheezes, rales or rhonchi  Cardiovascular: S1, S2, RRR  Gastrointestinal: BS+, soft, NT/ND  Extremities: No peripheral edema    Hospital Medications:   MEDICATIONS  (STANDING):  amLODIPine   Tablet 5 milliGRAM(s) Oral daily  atorvastatin 20 milliGRAM(s) Oral at bedtime  dextrose 5%. 1000 milliLiter(s) (50 mL/Hr) IV Continuous <Continuous>  dextrose 5%. 1000 milliLiter(s) (100 mL/Hr) IV Continuous <Continuous>  dextrose 50% Injectable 12.5 Gram(s) IV Push once  dextrose 50% Injectable 25 Gram(s) IV Push once  dextrose 50% Injectable 25 Gram(s) IV Push once  glucagon  Injectable 1 milliGRAM(s) IntraMuscular once  heparin   Injectable 5000 Unit(s) SubCutaneous every 8 hours  insulin lispro (ADMELOG) corrective regimen sliding scale   SubCutaneous three times a day before meals  insulin lispro (ADMELOG) corrective regimen sliding scale   SubCutaneous at bedtime  sodium bicarbonate  Infusion 0.07 mEq/kG/Hr (75 mL/Hr) IV Continuous <Continuous>      LABS:  12-26    139  |  106  |  68<H>  ----------------------------<  113<H>  4.7   |  19<L>  |  2.96<H>    Ca    8.9      26 Dec 2023 07:29      Creatinine Trend: 2.96 <--, 3.35 <--, 3.39 <--, 3.50 <--, 3.47 <--                                10.3   9.41  )-----------( 328      ( 26 Dec 2023 07:29 )             31.6     Urine Studies:  Urinalysis - [12-26-23 @ 07:29]      Color  / Appearance  / SG  / pH       Gluc 113 / Ketone   / Bili  / Urobili        Blood  / Protein  / Leuk Est  / Nitrite       RBC  / WBC  / Hyaline  / Gran  / Sq Epi  / Non Sq Epi  / Bacteria     Urine Creatinine 36      [12-23-23 @ 22:39]  Urine Protein 8      [12-23-23 @ 22:39]  Urine Sodium 63      [12-23-23 @ 22:39]  Urine Urea Nitrogen 361      [12-23-23 @ 22:39]  Urine Potassium 18      [12-23-23 @ 22:39]  Urine Osmolality 282      [12-23-23 @ 22:39]    Iron 35, TIBC 247, %sat 14      [12-24-23 @ 07:18]  Ferritin 384      [12-24-23 @ 07:18]  PTH -- (Ca 8.8)      [12-24-23 @ 07:18]   49  Vitamin D (25OH) 46.5      [12-24-23 @ 07:18]  TSH 2.16      [12-24-23 @ 07:18]  Lipid: chol 92, TG 91, HDL 32, LDL --      [12-24-23 @ 07:18]    HCV 0.07, Nonreact      [12-24-23 @ 07:18]      RADIOLOGY & ADDITIONAL STUDIES:

## 2023-12-27 LAB
ANION GAP SERPL CALC-SCNC: 10 MMOL/L — SIGNIFICANT CHANGE UP (ref 5–17)
ANION GAP SERPL CALC-SCNC: 10 MMOL/L — SIGNIFICANT CHANGE UP (ref 5–17)
BUN SERPL-MCNC: 60 MG/DL — HIGH (ref 7–23)
BUN SERPL-MCNC: 60 MG/DL — HIGH (ref 7–23)
CALCIUM SERPL-MCNC: 9.1 MG/DL — SIGNIFICANT CHANGE UP (ref 8.4–10.5)
CALCIUM SERPL-MCNC: 9.1 MG/DL — SIGNIFICANT CHANGE UP (ref 8.4–10.5)
CHLORIDE SERPL-SCNC: 108 MMOL/L — SIGNIFICANT CHANGE UP (ref 96–108)
CHLORIDE SERPL-SCNC: 108 MMOL/L — SIGNIFICANT CHANGE UP (ref 96–108)
CO2 SERPL-SCNC: 23 MMOL/L — SIGNIFICANT CHANGE UP (ref 22–31)
CO2 SERPL-SCNC: 23 MMOL/L — SIGNIFICANT CHANGE UP (ref 22–31)
CREAT SERPL-MCNC: 2.54 MG/DL — HIGH (ref 0.5–1.3)
CREAT SERPL-MCNC: 2.54 MG/DL — HIGH (ref 0.5–1.3)
EGFR: 27 ML/MIN/1.73M2 — LOW
EGFR: 27 ML/MIN/1.73M2 — LOW
GLUCOSE BLDC GLUCOMTR-MCNC: 110 MG/DL — HIGH (ref 70–99)
GLUCOSE BLDC GLUCOMTR-MCNC: 110 MG/DL — HIGH (ref 70–99)
GLUCOSE BLDC GLUCOMTR-MCNC: 116 MG/DL — HIGH (ref 70–99)
GLUCOSE BLDC GLUCOMTR-MCNC: 116 MG/DL — HIGH (ref 70–99)
GLUCOSE BLDC GLUCOMTR-MCNC: 127 MG/DL — HIGH (ref 70–99)
GLUCOSE BLDC GLUCOMTR-MCNC: 127 MG/DL — HIGH (ref 70–99)
GLUCOSE BLDC GLUCOMTR-MCNC: 89 MG/DL — SIGNIFICANT CHANGE UP (ref 70–99)
GLUCOSE BLDC GLUCOMTR-MCNC: 89 MG/DL — SIGNIFICANT CHANGE UP (ref 70–99)
GLUCOSE SERPL-MCNC: 113 MG/DL — HIGH (ref 70–99)
GLUCOSE SERPL-MCNC: 113 MG/DL — HIGH (ref 70–99)
HCT VFR BLD CALC: 29.5 % — LOW (ref 39–50)
HCT VFR BLD CALC: 29.5 % — LOW (ref 39–50)
HGB BLD-MCNC: 9.8 G/DL — LOW (ref 13–17)
HGB BLD-MCNC: 9.8 G/DL — LOW (ref 13–17)
MCHC RBC-ENTMCNC: 26.6 PG — LOW (ref 27–34)
MCHC RBC-ENTMCNC: 26.6 PG — LOW (ref 27–34)
MCHC RBC-ENTMCNC: 33.2 GM/DL — SIGNIFICANT CHANGE UP (ref 32–36)
MCHC RBC-ENTMCNC: 33.2 GM/DL — SIGNIFICANT CHANGE UP (ref 32–36)
MCV RBC AUTO: 80.2 FL — SIGNIFICANT CHANGE UP (ref 80–100)
MCV RBC AUTO: 80.2 FL — SIGNIFICANT CHANGE UP (ref 80–100)
NRBC # BLD: 0 /100 WBCS — SIGNIFICANT CHANGE UP (ref 0–0)
NRBC # BLD: 0 /100 WBCS — SIGNIFICANT CHANGE UP (ref 0–0)
PLATELET # BLD AUTO: 320 K/UL — SIGNIFICANT CHANGE UP (ref 150–400)
PLATELET # BLD AUTO: 320 K/UL — SIGNIFICANT CHANGE UP (ref 150–400)
POTASSIUM SERPL-MCNC: 5 MMOL/L — SIGNIFICANT CHANGE UP (ref 3.5–5.3)
POTASSIUM SERPL-MCNC: 5 MMOL/L — SIGNIFICANT CHANGE UP (ref 3.5–5.3)
POTASSIUM SERPL-SCNC: 5 MMOL/L — SIGNIFICANT CHANGE UP (ref 3.5–5.3)
POTASSIUM SERPL-SCNC: 5 MMOL/L — SIGNIFICANT CHANGE UP (ref 3.5–5.3)
RBC # BLD: 3.68 M/UL — LOW (ref 4.2–5.8)
RBC # BLD: 3.68 M/UL — LOW (ref 4.2–5.8)
RBC # FLD: 13.5 % — SIGNIFICANT CHANGE UP (ref 10.3–14.5)
RBC # FLD: 13.5 % — SIGNIFICANT CHANGE UP (ref 10.3–14.5)
SODIUM SERPL-SCNC: 141 MMOL/L — SIGNIFICANT CHANGE UP (ref 135–145)
SODIUM SERPL-SCNC: 141 MMOL/L — SIGNIFICANT CHANGE UP (ref 135–145)
WBC # BLD: 9.04 K/UL — SIGNIFICANT CHANGE UP (ref 3.8–10.5)
WBC # BLD: 9.04 K/UL — SIGNIFICANT CHANGE UP (ref 3.8–10.5)
WBC # FLD AUTO: 9.04 K/UL — SIGNIFICANT CHANGE UP (ref 3.8–10.5)
WBC # FLD AUTO: 9.04 K/UL — SIGNIFICANT CHANGE UP (ref 3.8–10.5)

## 2023-12-27 RX ORDER — SODIUM CHLORIDE 9 MG/ML
1000 INJECTION INTRAMUSCULAR; INTRAVENOUS; SUBCUTANEOUS
Refills: 0 | Status: DISCONTINUED | OUTPATIENT
Start: 2023-12-27 | End: 2023-12-28

## 2023-12-27 RX ADMIN — ATORVASTATIN CALCIUM 20 MILLIGRAM(S): 80 TABLET, FILM COATED ORAL at 21:00

## 2023-12-27 RX ADMIN — SODIUM CHLORIDE 75 MILLILITER(S): 9 INJECTION INTRAMUSCULAR; INTRAVENOUS; SUBCUTANEOUS at 15:24

## 2023-12-27 RX ADMIN — AMLODIPINE BESYLATE 5 MILLIGRAM(S): 2.5 TABLET ORAL at 05:38

## 2023-12-27 RX ADMIN — Medication 75 MEQ/KG/HR: at 05:38

## 2023-12-27 NOTE — PROGRESS NOTE ADULT - ASSESSMENT
69yo 80kg m w pmh htn, hld, dm, p/w abnormal lab result showing worsening renal function; in er, found to have iban; admit to medicine for further mgmt.    # IBAN (acute kidney injury)  likely ATN from multiple insults-- NSAID, + HCTZ + Recent flu dehydration / hypotension  downtrending creat  renal/bladder US no hydro  sodium bicarb per renal, dc today   nephro following  trial of oral hydration today; if cr trending down tomorrow , dc plan for tomorrow    # DM (diabetes mellitus)  hold home regimen  a1c 6.2  monitor fingerstick glu tidac + hs  low dose correctional scale lispro tidac + hs while inhouse    # HTN (hypertension)  norvasc  hold home lotensin, microzide in lieu of iban    # HLD (hyperlipidemia)  lipitor    vte ppx scd    dc planning likely tomorrow     Please contact with any questions or concerns [phone].   69yo 80kg m w pmh htn, hld, dm, p/w abnormal lab result showing worsening renal function; in er, found to have iban; admit to medicine for further mgmt.    # IBAN (acute kidney injury)  likely ATN from multiple insults-- NSAID, + HCTZ + Recent flu dehydration / hypotension  downtrending creat  renal/bladder US no hydro  sodium bicarb per renal, dc today   nephro following  trial of oral hydration today; if cr trending down tomorrow , dc plan for tomorrow    # DM (diabetes mellitus)  hold home regimen  a1c 6.2  monitor fingerstick glu tidac + hs  low dose correctional scale lispro tidac + hs while inhouse    # HTN (hypertension)  norvasc  hold home lotensin, microzide in lieu of iban    # HLD (hyperlipidemia)  lipitor    vte ppx scd    dc planning likely tomorrow     Please contact with any questions or concerns [phone].

## 2023-12-27 NOTE — PROGRESS NOTE ADULT - SUBJECTIVE AND OBJECTIVE BOX
Patient is a 68y old  Male who presents with a chief complaint of abnormal lab result (26 Dec 2023 14:17)      SUBJECTIVE / OVERNIGHT EVENTS:  Patient seen and examined.   NO complaints.   Feels well.      Vital Signs Last 24 Hrs  T(C): 36.9 (27 Dec 2023 05:03), Max: 37 (27 Dec 2023 00:43)  T(F): 98.4 (27 Dec 2023 05:03), Max: 98.6 (27 Dec 2023 00:43)  HR: 79 (27 Dec 2023 05:03) (65 - 79)  BP: 116/62 (27 Dec 2023 05:03) (100/62 - 116/62)  BP(mean): --  RR: 18 (27 Dec 2023 05:03) (18 - 18)  SpO2: 97% (27 Dec 2023 05:03) (97% - 99%)    Parameters below as of 27 Dec 2023 05:03  Patient On (Oxygen Delivery Method): room air      I&O's Summary    26 Dec 2023 07:01  -  27 Dec 2023 07:00  --------------------------------------------------------  IN: 1210 mL / OUT: 0 mL / NET: 1210 mL        PHYSICAL EXAM:  GENERAL: NAD, AAOx3  HEAD:  Atraumatic, Normocephalic  EYES: EOMI; conjunctiva and sclera clear  NECK: Supple, No JVD, No LAD  CHEST/LUNG: B/L air entry; No wheezes, rales or rhonci   HEART: Regular rate and rhythm; No murmurs, rubs, or gallops  ABDOMEN: Soft, Nontender, Nondistended; Bowel sounds present  EXTREMITIES:  2+ Peripheral Pulses, No clubbing, cyanosis, or edema  SKIN: No rashes or lesions    LABS:                        9.8    9.04  )-----------( 320      ( 27 Dec 2023 07:11 )             29.5     12-27    141  |  108  |  60<H>  ----------------------------<  113<H>  5.0   |  23  |  2.54<H>    Ca    9.1      27 Dec 2023 07:12        CAPILLARY BLOOD GLUCOSE      POCT Blood Glucose.: 116 mg/dL (27 Dec 2023 08:19)  POCT Blood Glucose.: 160 mg/dL (26 Dec 2023 21:19)  POCT Blood Glucose.: 111 mg/dL (26 Dec 2023 17:22)  POCT Blood Glucose.: 100 mg/dL (26 Dec 2023 12:55)        Urinalysis Basic - ( 27 Dec 2023 07:12 )    Color: x / Appearance: x / SG: x / pH: x  Gluc: 113 mg/dL / Ketone: x  / Bili: x / Urobili: x   Blood: x / Protein: x / Nitrite: x   Leuk Esterase: x / RBC: x / WBC x   Sq Epi: x / Non Sq Epi: x / Bacteria: x        RADIOLOGY & ADDITIONAL TESTS:    Imaging Personally Reviewed:  [x] YES  [ ] NO    Consultant(s) Notes Reviewed:  [x] YES  [ ] NO      MEDICATIONS  (STANDING):  amLODIPine   Tablet 5 milliGRAM(s) Oral daily  atorvastatin 20 milliGRAM(s) Oral at bedtime  dextrose 5%. 1000 milliLiter(s) (100 mL/Hr) IV Continuous <Continuous>  dextrose 5%. 1000 milliLiter(s) (50 mL/Hr) IV Continuous <Continuous>  dextrose 50% Injectable 12.5 Gram(s) IV Push once  dextrose 50% Injectable 25 Gram(s) IV Push once  dextrose 50% Injectable 25 Gram(s) IV Push once  glucagon  Injectable 1 milliGRAM(s) IntraMuscular once  heparin   Injectable 5000 Unit(s) SubCutaneous every 8 hours  insulin lispro (ADMELOG) corrective regimen sliding scale   SubCutaneous three times a day before meals  insulin lispro (ADMELOG) corrective regimen sliding scale   SubCutaneous at bedtime    MEDICATIONS  (PRN):  acetaminophen     Tablet .. 650 milliGRAM(s) Oral every 6 hours PRN Temp greater or equal to 38C (100.4F), Mild Pain (1 - 3)  aluminum hydroxide/magnesium hydroxide/simethicone Suspension 30 milliLiter(s) Oral every 4 hours PRN Dyspepsia  dextrose Oral Gel 15 Gram(s) Oral once PRN Blood Glucose LESS THAN 70 milliGRAM(s)/deciliter  melatonin 3 milliGRAM(s) Oral at bedtime PRN Insomnia  ondansetron Injectable 4 milliGRAM(s) IV Push every 8 hours PRN Nausea and/or Vomiting      Care Discussed with Consultants/Other Providers [x] YES  [ ] NO    HEALTH ISSUES - PROBLEM Dx:  IBAN (acute kidney injury)    DM (diabetes mellitus)    HTN (hypertension)    HLD (hyperlipidemia)

## 2023-12-27 NOTE — PROGRESS NOTE ADULT - SUBJECTIVE AND OBJECTIVE BOX
[facility] [place] [facility]   MD ARMOND BRIGGS MD ANGELA WONG, PA BRYAN SNIPE, NP    TEL:  OFFICE: [phone]  From 5pm-7am Answering Service [phone]    -- RENAL FOLLOW UP NOTE ---Date of Service 12-27-23 @ 14:39    Patient is a 68y old  Male who presents with a chief complaint of abnormal lab result (27 Dec 2023 11:26)      Patient seen and examined at bedside. No chest pain/sob    VITALS:  T(F): 97.4 (12-27-23 @ 12:27), Max: 98.6 (12-27-23 @ 00:43)  HR: 75 (12-27-23 @ 12:27)  BP: 119/70 (12-27-23 @ 12:27)  RR: 18 (12-27-23 @ 12:27)  SpO2: 99% (12-27-23 @ 12:27)  Wt(kg): --    12-26 @ 07:01  -  12-27 @ 07:00  --------------------------------------------------------  IN: 1210 mL / OUT: 0 mL / NET: 1210 mL          PHYSICAL EXAM:  General: NAD  Neck: No JVD  Respiratory: CTAB, no wheezes, rales or rhonchi  Cardiovascular: S1, S2, RRR  Gastrointestinal: BS+, soft, NT/ND  Extremities: No peripheral edema    Hospital Medications:   MEDICATIONS  (STANDING):  amLODIPine   Tablet 5 milliGRAM(s) Oral daily  atorvastatin 20 milliGRAM(s) Oral at bedtime  dextrose 5%. 1000 milliLiter(s) (100 mL/Hr) IV Continuous <Continuous>  dextrose 5%. 1000 milliLiter(s) (50 mL/Hr) IV Continuous <Continuous>  dextrose 50% Injectable 12.5 Gram(s) IV Push once  dextrose 50% Injectable 25 Gram(s) IV Push once  dextrose 50% Injectable 25 Gram(s) IV Push once  glucagon  Injectable 1 milliGRAM(s) IntraMuscular once  heparin   Injectable 5000 Unit(s) SubCutaneous every 8 hours  insulin lispro (ADMELOG) corrective regimen sliding scale   SubCutaneous three times a day before meals  insulin lispro (ADMELOG) corrective regimen sliding scale   SubCutaneous at bedtime      LABS:  12-27    141  |  108  |  60<H>  ----------------------------<  113<H>  5.0   |  23  |  2.54<H>    Ca    9.1      27 Dec 2023 07:12      Creatinine Trend: 2.54 <--, 2.96 <--, 3.35 <--, 3.39 <--, 3.50 <--, 3.47 <--                                9.8    9.04  )-----------( 320      ( 27 Dec 2023 07:11 )             29.5     Urine Studies:  Urinalysis - [12-27-23 @ 07:12]      Color  / Appearance  / SG  / pH       Gluc 113 / Ketone   / Bili  / Urobili        Blood  / Protein  / Leuk Est  / Nitrite       RBC  / WBC  / Hyaline  / Gran  / Sq Epi  / Non Sq Epi  / Bacteria     Urine Creatinine 36      [12-23-23 @ 22:39]  Urine Protein 8      [12-23-23 @ 22:39]  Urine Sodium 63      [12-23-23 @ 22:39]  Urine Urea Nitrogen 361      [12-23-23 @ 22:39]  Urine Potassium 18      [12-23-23 @ 22:39]  Urine Osmolality 282      [12-23-23 @ 22:39]    Iron 35, TIBC 247, %sat 14      [12-24-23 @ 07:18]  Ferritin 384      [12-24-23 @ 07:18]  PTH -- (Ca 8.8)      [12-24-23 @ 07:18]   49  Vitamin D (25OH) 46.5      [12-24-23 @ 07:18]  TSH 2.16      [12-24-23 @ 07:18]  Lipid: chol 92, TG 91, HDL 32, LDL --      [12-24-23 @ 07:18]    HCV 0.07, Nonreact      [12-24-23 @ 07:18]      RADIOLOGY & ADDITIONAL STUDIES:   [facility] – [place] [facility]   MD ARMOND BRIGGS MD ANGELA WONG, PA BRYAN SNIPE, NP    TEL:  OFFICE: [phone]  From 5pm-7am Answering Service [phone]    -- RENAL FOLLOW UP NOTE ---Date of Service 12-27-23 @ 14:39    Patient is a 68y old  Male who presents with a chief complaint of abnormal lab result (27 Dec 2023 11:26)      Patient seen and examined at bedside. No chest pain/sob    VITALS:  T(F): 97.4 (12-27-23 @ 12:27), Max: 98.6 (12-27-23 @ 00:43)  HR: 75 (12-27-23 @ 12:27)  BP: 119/70 (12-27-23 @ 12:27)  RR: 18 (12-27-23 @ 12:27)  SpO2: 99% (12-27-23 @ 12:27)  Wt(kg): --    12-26 @ 07:01  -  12-27 @ 07:00  --------------------------------------------------------  IN: 1210 mL / OUT: 0 mL / NET: 1210 mL          PHYSICAL EXAM:  General: NAD  Neck: No JVD  Respiratory: CTAB, no wheezes, rales or rhonchi  Cardiovascular: S1, S2, RRR  Gastrointestinal: BS+, soft, NT/ND  Extremities: No peripheral edema    Hospital Medications:   MEDICATIONS  (STANDING):  amLODIPine   Tablet 5 milliGRAM(s) Oral daily  atorvastatin 20 milliGRAM(s) Oral at bedtime  dextrose 5%. 1000 milliLiter(s) (100 mL/Hr) IV Continuous <Continuous>  dextrose 5%. 1000 milliLiter(s) (50 mL/Hr) IV Continuous <Continuous>  dextrose 50% Injectable 12.5 Gram(s) IV Push once  dextrose 50% Injectable 25 Gram(s) IV Push once  dextrose 50% Injectable 25 Gram(s) IV Push once  glucagon  Injectable 1 milliGRAM(s) IntraMuscular once  heparin   Injectable 5000 Unit(s) SubCutaneous every 8 hours  insulin lispro (ADMELOG) corrective regimen sliding scale   SubCutaneous three times a day before meals  insulin lispro (ADMELOG) corrective regimen sliding scale   SubCutaneous at bedtime      LABS:  12-27    141  |  108  |  60<H>  ----------------------------<  113<H>  5.0   |  23  |  2.54<H>    Ca    9.1      27 Dec 2023 07:12      Creatinine Trend: 2.54 <--, 2.96 <--, 3.35 <--, 3.39 <--, 3.50 <--, 3.47 <--                                9.8    9.04  )-----------( 320      ( 27 Dec 2023 07:11 )             29.5     Urine Studies:  Urinalysis - [12-27-23 @ 07:12]      Color  / Appearance  / SG  / pH       Gluc 113 / Ketone   / Bili  / Urobili        Blood  / Protein  / Leuk Est  / Nitrite       RBC  / WBC  / Hyaline  / Gran  / Sq Epi  / Non Sq Epi  / Bacteria     Urine Creatinine 36      [12-23-23 @ 22:39]  Urine Protein 8      [12-23-23 @ 22:39]  Urine Sodium 63      [12-23-23 @ 22:39]  Urine Urea Nitrogen 361      [12-23-23 @ 22:39]  Urine Potassium 18      [12-23-23 @ 22:39]  Urine Osmolality 282      [12-23-23 @ 22:39]    Iron 35, TIBC 247, %sat 14      [12-24-23 @ 07:18]  Ferritin 384      [12-24-23 @ 07:18]  PTH -- (Ca 8.8)      [12-24-23 @ 07:18]   49  Vitamin D (25OH) 46.5      [12-24-23 @ 07:18]  TSH 2.16      [12-24-23 @ 07:18]  Lipid: chol 92, TG 91, HDL 32, LDL --      [12-24-23 @ 07:18]    HCV 0.07, Nonreact      [12-24-23 @ 07:18]      RADIOLOGY & ADDITIONAL STUDIES:

## 2023-12-27 NOTE — PROGRESS NOTE ADULT - ASSESSMENT
67yo 80kg m w pmh htn, hld, dm, p/w abnormal lab result showing worsening renal function; in    IBAN  Improving with IV fluids  baseline scr unknown, per pt no prior history of kidney disease  iban likely sec to nsaid use/ hctz/ ace and hypotensive episode --  renal sonogram with no hydro  Recommend check bladder 12/27 scan to rule out retention   urine lytes inconclusive  Ua with no protein no rbc  continue gentle hydration today --change to NS 75cc/hr for 1 day  monitor closely   avoid nephrotoxics, nsaids rca    hyperkalemia  sec to RF  no retention   improved   low k diet   monitor closely     HTN  BP stable  monitor     acidosis  Improved after fluids with bicarb  monitor serum co2         69yo 80kg m w pmh htn, hld, dm, p/w abnormal lab result showing worsening renal function; in    IBAN  Improving with IV fluids  baseline scr unknown, per pt no prior history of kidney disease  iban likely sec to nsaid use/ hctz/ ace and hypotensive episode --  renal sonogram with no hydro  Recommend check bladder 12/27 scan to rule out retention   urine lytes inconclusive  Ua with no protein no rbc  continue gentle hydration today --change to NS 75cc/hr for 1 day  monitor closely   avoid nephrotoxics, nsaids rca    hyperkalemia  sec to RF  no retention   improved   low k diet   monitor closely     HTN  BP stable  monitor     acidosis  Improved after fluids with bicarb  monitor serum co2         67yo 80kg m w pmh htn, hld, dm, p/w abnormal lab result showing worsening renal function; in    IBAN  Improving with IV fluids  baseline scr unknown, per pt no prior history of kidney disease  iban likely sec to nsaid use/ hctz/ ace and hypotensive episode --  renal sonogram with no hydro  bladder neg for  retention   urine lytes inconclusive  Ua with no protein no rbc  continue gentle hydration today --change to NS 75cc/hr for 1 day  monitor closely   avoid nephrotoxics, nsaids rca    hyperkalemia  sec to RF  no retention   improved   low k diet   monitor closely     HTN  BP stable  monitor     acidosis  Improved after fluids with bicarb  monitor serum co2

## 2023-12-28 LAB
ANION GAP SERPL CALC-SCNC: 11 MMOL/L — SIGNIFICANT CHANGE UP (ref 5–17)
ANION GAP SERPL CALC-SCNC: 11 MMOL/L — SIGNIFICANT CHANGE UP (ref 5–17)
BUN SERPL-MCNC: 55 MG/DL — HIGH (ref 7–23)
BUN SERPL-MCNC: 55 MG/DL — HIGH (ref 7–23)
CALCIUM SERPL-MCNC: 8.9 MG/DL — SIGNIFICANT CHANGE UP (ref 8.4–10.5)
CALCIUM SERPL-MCNC: 8.9 MG/DL — SIGNIFICANT CHANGE UP (ref 8.4–10.5)
CHLORIDE SERPL-SCNC: 110 MMOL/L — HIGH (ref 96–108)
CHLORIDE SERPL-SCNC: 110 MMOL/L — HIGH (ref 96–108)
CO2 SERPL-SCNC: 20 MMOL/L — LOW (ref 22–31)
CO2 SERPL-SCNC: 20 MMOL/L — LOW (ref 22–31)
CREAT SERPL-MCNC: 2.22 MG/DL — HIGH (ref 0.5–1.3)
CREAT SERPL-MCNC: 2.22 MG/DL — HIGH (ref 0.5–1.3)
EGFR: 31 ML/MIN/1.73M2 — LOW
EGFR: 31 ML/MIN/1.73M2 — LOW
GLUCOSE BLDC GLUCOMTR-MCNC: 100 MG/DL — HIGH (ref 70–99)
GLUCOSE BLDC GLUCOMTR-MCNC: 100 MG/DL — HIGH (ref 70–99)
GLUCOSE BLDC GLUCOMTR-MCNC: 115 MG/DL — HIGH (ref 70–99)
GLUCOSE BLDC GLUCOMTR-MCNC: 115 MG/DL — HIGH (ref 70–99)
GLUCOSE BLDC GLUCOMTR-MCNC: 121 MG/DL — HIGH (ref 70–99)
GLUCOSE BLDC GLUCOMTR-MCNC: 121 MG/DL — HIGH (ref 70–99)
GLUCOSE BLDC GLUCOMTR-MCNC: 142 MG/DL — HIGH (ref 70–99)
GLUCOSE BLDC GLUCOMTR-MCNC: 142 MG/DL — HIGH (ref 70–99)
GLUCOSE SERPL-MCNC: 109 MG/DL — HIGH (ref 70–99)
GLUCOSE SERPL-MCNC: 109 MG/DL — HIGH (ref 70–99)
POTASSIUM SERPL-MCNC: 4.5 MMOL/L — SIGNIFICANT CHANGE UP (ref 3.5–5.3)
POTASSIUM SERPL-MCNC: 4.5 MMOL/L — SIGNIFICANT CHANGE UP (ref 3.5–5.3)
POTASSIUM SERPL-SCNC: 4.5 MMOL/L — SIGNIFICANT CHANGE UP (ref 3.5–5.3)
POTASSIUM SERPL-SCNC: 4.5 MMOL/L — SIGNIFICANT CHANGE UP (ref 3.5–5.3)
SODIUM SERPL-SCNC: 141 MMOL/L — SIGNIFICANT CHANGE UP (ref 135–145)
SODIUM SERPL-SCNC: 141 MMOL/L — SIGNIFICANT CHANGE UP (ref 135–145)

## 2023-12-28 RX ADMIN — ATORVASTATIN CALCIUM 20 MILLIGRAM(S): 80 TABLET, FILM COATED ORAL at 21:10

## 2023-12-28 NOTE — PROGRESS NOTE ADULT - SUBJECTIVE AND OBJECTIVE BOX
Patient is a 68y old  Male who presents with a chief complaint of abnormal lab result (28 Dec 2023 10:28)      SUBJECTIVE / OVERNIGHT EVENTS:  Patient seen and examined.   No complaints.       Vital Signs Last 24 Hrs  T(C): 36.9 (28 Dec 2023 05:20), Max: 37.2 (28 Dec 2023 00:58)  T(F): 98.4 (28 Dec 2023 05:20), Max: 99 (28 Dec 2023 00:58)  HR: 60 (28 Dec 2023 05:20) (60 - 75)  BP: 97/62 (28 Dec 2023 05:20) (97/62 - 119/70)  BP(mean): --  RR: 18 (28 Dec 2023 05:20) (18 - 18)  SpO2: 97% (28 Dec 2023 05:20) (97% - 99%)    Parameters below as of 28 Dec 2023 05:20  Patient On (Oxygen Delivery Method): room air      I&O's Summary    27 Dec 2023 07:01  -  28 Dec 2023 07:00  --------------------------------------------------------  IN: 960 mL / OUT: 0 mL / NET: 960 mL    28 Dec 2023 07:01  -  28 Dec 2023 11:56  --------------------------------------------------------  IN: 240 mL / OUT: 0 mL / NET: 240 mL        PHYSICAL EXAM:  GENERAL: NAD, AAOx3  HEAD:  Atraumatic, Normocephalic  EYES: EOMI; conjunctiva and sclera clear  NECK: Supple, No JVD, No LAD  CHEST/LUNG: B/L air entry; No wheezes, rales or rhonci   HEART: Regular rate and rhythm; No murmurs, rubs, or gallops  ABDOMEN: Soft, Nontender, Nondistended; Bowel sounds present  EXTREMITIES:  2+ Peripheral Pulses, No clubbing, cyanosis, or edema  SKIN: No rashes or lesions    LABS:                        9.8    9.04  )-----------( 320      ( 27 Dec 2023 07:11 )             29.5     12-28    141  |  110<H>  |  55<H>  ----------------------------<  109<H>  4.5   |  20<L>  |  2.22<H>    Ca    8.9      28 Dec 2023 06:50        CAPILLARY BLOOD GLUCOSE      POCT Blood Glucose.: 121 mg/dL (28 Dec 2023 08:15)  POCT Blood Glucose.: 127 mg/dL (27 Dec 2023 21:48)  POCT Blood Glucose.: 89 mg/dL (27 Dec 2023 17:12)  POCT Blood Glucose.: 110 mg/dL (27 Dec 2023 12:19)        Urinalysis Basic - ( 28 Dec 2023 06:50 )    Color: x / Appearance: x / SG: x / pH: x  Gluc: 109 mg/dL / Ketone: x  / Bili: x / Urobili: x   Blood: x / Protein: x / Nitrite: x   Leuk Esterase: x / RBC: x / WBC x   Sq Epi: x / Non Sq Epi: x / Bacteria: x        RADIOLOGY & ADDITIONAL TESTS:    Imaging Personally Reviewed:  [x] YES  [ ] NO    Consultant(s) Notes Reviewed:  [x] YES  [ ] NO      MEDICATIONS  (STANDING):  amLODIPine   Tablet 5 milliGRAM(s) Oral daily  atorvastatin 20 milliGRAM(s) Oral at bedtime  dextrose 5%. 1000 milliLiter(s) (50 mL/Hr) IV Continuous <Continuous>  dextrose 5%. 1000 milliLiter(s) (100 mL/Hr) IV Continuous <Continuous>  dextrose 50% Injectable 12.5 Gram(s) IV Push once  dextrose 50% Injectable 25 Gram(s) IV Push once  dextrose 50% Injectable 25 Gram(s) IV Push once  glucagon  Injectable 1 milliGRAM(s) IntraMuscular once  heparin   Injectable 5000 Unit(s) SubCutaneous every 8 hours  insulin lispro (ADMELOG) corrective regimen sliding scale   SubCutaneous three times a day before meals  insulin lispro (ADMELOG) corrective regimen sliding scale   SubCutaneous at bedtime    MEDICATIONS  (PRN):  acetaminophen     Tablet .. 650 milliGRAM(s) Oral every 6 hours PRN Temp greater or equal to 38C (100.4F), Mild Pain (1 - 3)  aluminum hydroxide/magnesium hydroxide/simethicone Suspension 30 milliLiter(s) Oral every 4 hours PRN Dyspepsia  dextrose Oral Gel 15 Gram(s) Oral once PRN Blood Glucose LESS THAN 70 milliGRAM(s)/deciliter  melatonin 3 milliGRAM(s) Oral at bedtime PRN Insomnia  ondansetron Injectable 4 milliGRAM(s) IV Push every 8 hours PRN Nausea and/or Vomiting      Care Discussed with Consultants/Other Providers [x] YES  [ ] NO    HEALTH ISSUES - PROBLEM Dx:  IBAN (acute kidney injury)    DM (diabetes mellitus)    HTN (hypertension)    HLD (hyperlipidemia)

## 2023-12-28 NOTE — DISCHARGE NOTE PROVIDER - HOSPITAL COURSE
HPI:  67yo 80kg m w pmh htn, hld, dm, p/w abnormal lab result showing worsening renal function; in er, found to have iban; admit to medicine for further mgmt  (23 Dec 2023 20:44)    Hospital Course: Nephrology consulted for IBAN likely secondary to nsaid use/ hctz/ ace and hypotensive episode. Baseline scr unknown, per pt no prior history of kidney disease. Renal sonogram with no hydronephrosis. Bladder negative for retention. Urine lytes inconclusive and UA with no protein no rbc. Renal function improving s/p IVF trial (Creatinine 3.47-->2.11). Patient was initiated on NaHCO3 650mg BID in setting of acidosis due to renal failure - now improved s/p fluids with bicarb. Pt stable from renal perspective for d/c;    Discharge/Dispo/Med rec discussed with attending  ____. Patient medically cleared for discharge ____ with outpatient follow up           Important Medication Changes and Reason:    Active or Pending Issues Requiring Follow-up:  - Follow up with nephrology outpatient.    Advanced Directives:   [ ] Full code  [ ] DNR  [ ] Hospice    Discharge Diagnoses:  IBAN  hyperkalemia  HTN  Acidosis           HPI:  69yo 80kg m w pmh htn, hld, dm, p/w abnormal lab result showing worsening renal function; in er, found to have iban; admit to medicine for further mgmt  (23 Dec 2023 20:44)    Hospital Course: Nephrology consulted for IBAN likely secondary to nsaid use/ hctz/ ace and hypotensive episode. Baseline scr unknown, per pt no prior history of kidney disease. Renal sonogram with no hydronephrosis. Bladder negative for retention. Urine lytes inconclusive and UA with no protein no rbc. Renal function improving s/p IVF trial (Creatinine 3.47-->2.11). Patient was initiated on NaHCO3 650mg BID in setting of acidosis due to renal failure - now improved s/p fluids with bicarb. Pt stable from renal perspective for d/c;    Discharge/Dispo/Med rec discussed with attending  ____. Patient medically cleared for discharge ____ with outpatient follow up           Important Medication Changes and Reason:    Active or Pending Issues Requiring Follow-up:  - Follow up with nephrology outpatient.    Advanced Directives:   [ ] Full code  [ ] DNR  [ ] Hospice    Discharge Diagnoses:  IBAN  hyperkalemia  HTN  Acidosis           HPI:  69yo 80kg m w pmh htn, hld, dm, p/w abnormal lab result showing worsening renal function; in er, found to have iban; admit to medicine for further mgmt  (23 Dec 2023 20:44)    Hospital Course: Nephrology consulted for IBAN likely secondary to nsaid use/ hctz/ ace and hypotensive episode. Baseline scr unknown, per pt no prior history of kidney disease. Renal sonogram with no hydronephrosis. Bladder negative for retention. Urine lytes inconclusive and UA with no protein no rbc. Renal function improving s/p IVF trial (Creatinine 3.47-->2.11). Patient was initiated on NaHCO3 650mg BID in setting of acidosis due to renal failure - now improved s/p fluids with bicarb. Pt stable from renal perspective for d/c;    Discharge/Dispo/Med rec discussed with attending Dr. Matthews. Patient medically cleared for discharge home with outpatient follow up.           Important Medication Changes and Reason:    Active or Pending Issues Requiring Follow-up:  - Follow up with nephrology outpatient.    Advanced Directives:   [x] Full code  [ ] DNR  [ ] Hospice    Discharge Diagnoses:  IBAN  hyperkalemia  HTN  Acidosis           HPI:  67yo 80kg m w pmh htn, hld, dm, p/w abnormal lab result showing worsening renal function; in er, found to have iban; admit to medicine for further mgmt  (23 Dec 2023 20:44)    Hospital Course: Nephrology consulted for IBAN likely secondary to nsaid use/ hctz/ ace and hypotensive episode. Baseline scr unknown, per pt no prior history of kidney disease. Renal sonogram with no hydronephrosis. Bladder negative for retention. Urine lytes inconclusive and UA with no protein no rbc. Renal function improving s/p IVF trial (Creatinine 3.47-->2.11). Patient was initiated on NaHCO3 650mg BID in setting of acidosis due to renal failure - now improved s/p fluids with bicarb. Pt stable from renal perspective for d/c;    Discharge/Dispo/Med rec discussed with attending Dr. Matthews. Patient medically cleared for discharge home with outpatient follow up.           Important Medication Changes and Reason:    Active or Pending Issues Requiring Follow-up:  - Follow up with nephrology outpatient.    Advanced Directives:   [x] Full code  [ ] DNR  [ ] Hospice    Discharge Diagnoses:  IBAN  hyperkalemia  HTN  Acidosis           HPI:  69yo 80kg m w pmh htn, hld, dm, p/w abnormal lab result showing worsening renal function; in er, found to have iban; admit to medicine for further mgmt  (23 Dec 2023 20:44)    Hospital Course: Nephrology consulted for IBAN likely secondary to nsaid use/ hctz/ ace and hypotensive episode. Baseline scr unknown, per pt no prior history of kidney disease. Renal sonogram with no hydronephrosis. Bladder negative for retention. Urine lytes inconclusive and UA with no protein no rbc. Renal function improving s/p IVF trial (Creatinine 3.47-->2.11). Patient was initiated on NaHCO3 650mg BID in setting of acidosis due to renal failure - now improved s/p fluids with bicarb. Pt stable from renal perspective for d/c;    Discharge/Dispo/Med rec discussed with attending Dr. Matthews. Patient medically cleared for discharge home with outpatient follow up.           Important Medication Changes and Reason:  Hold Benzapril and Microzide    Active or Pending Issues Requiring Follow-up:  - Follow up with nephrology outpatient.    Advanced Directives:   [x] Full code  [ ] DNR  [ ] Hospice    Discharge Diagnoses:  IBAN  hyperkalemia  HTN  Acidosis

## 2023-12-28 NOTE — DISCHARGE NOTE PROVIDER - NSDCFUADDAPPT_GEN_ALL_CORE_FT
APPTS ARE READY TO BE MADE: [x] YES    Best Family or Patient Contact (if needed):    Additional Information about above appointments (if needed):    1:   2:   3:     Other comments or requests:    APPTS ARE READY TO BE MADE: [x] YES    Best Family or Patient Contact (if needed):    Additional Information about above appointments (if needed):    1:   2:   3:     Other comments or requests:   Pt [id number] ARE READY TO BE MADE: [x] YES    Best Family or Patient Contact (if needed):    Additional Information about above appointments (if needed):    1:   2:   3:     Other comments or requests:   [facility] [address]

## 2023-12-28 NOTE — PROGRESS NOTE ADULT - SUBJECTIVE AND OBJECTIVE BOX
[facility]   MD ARMOND BRIGGS MD ANGELA WONG, PA QIAN CHEN, NP    TEL:  OFFICE: [phone]  From 5pm-7am Answering Service [phone]    -- RENAL FOLLOW UP NOTE ---Date of Service 12-28-23 @ 13:18    Patient is a 68y old  Male who presents with a chief complaint of abnormal lab result.    Patient seen and examined at bedside. No chest pain/sob    VITALS:  T(F): 97.9 (12-28-23 @ 12:07), Max: 99 (12-28-23 @ 00:58)  HR: 64 (12-28-23 @ 12:07)  BP: 112/72 (12-28-23 @ 12:07)  RR: 18 (12-28-23 @ 12:07)  SpO2: 97% (12-28-23 @ 12:07)  Wt(kg): --    12-27 @ 07:01  -  12-28 @ 07:00  --------------------------------------------------------  IN: 960 mL / OUT: 0 mL / NET: 960 mL    12-28 @ 07:01  -  12-28 @ 13:18  --------------------------------------------------------  IN: 240 mL / OUT: 0 mL / NET: 240 mL          PHYSICAL EXAM:  General: NAD  Neck: No JVD  Respiratory: CTAB, no wheezes, rales or rhonchi  Cardiovascular: S1, S2, RRR  Gastrointestinal: BS+, soft, NT/ND  Extremities: No peripheral edema    Hospital Medications:   MEDICATIONS  (STANDING):  amLODIPine   Tablet 5 milliGRAM(s) Oral daily  atorvastatin 20 milliGRAM(s) Oral at bedtime  dextrose 5%. 1000 milliLiter(s) (50 mL/Hr) IV Continuous <Continuous>  dextrose 5%. 1000 milliLiter(s) (100 mL/Hr) IV Continuous <Continuous>  dextrose 50% Injectable 12.5 Gram(s) IV Push once  dextrose 50% Injectable 25 Gram(s) IV Push once  dextrose 50% Injectable 25 Gram(s) IV Push once  glucagon  Injectable 1 milliGRAM(s) IntraMuscular once  heparin   Injectable 5000 Unit(s) SubCutaneous every 8 hours  insulin lispro (ADMELOG) corrective regimen sliding scale   SubCutaneous three times a day before meals  insulin lispro (ADMELOG) corrective regimen sliding scale   SubCutaneous at bedtime      LABS:  12-28    141  |  110<H>  |  55<H>  ----------------------------<  109<H>  4.5   |  20<L>  |  2.22<H>    Ca    8.9      28 Dec 2023 06:50      Creatinine Trend: 2.22 <--, 2.54 <--, 2.96 <--, 3.35 <--, 3.39 <--, 3.50 <--, 3.47 <--                 9.8    9.04  )-----------( 320      ( 27 Dec 2023 07:11 )             29.5     Urine Studies:  Urinalysis - [12-28-23 @ 06:50]      Color  / Appearance  / SG  / pH       Gluc 109 / Ketone   / Bili  / Urobili        Blood  / Protein  / Leuk Est  / Nitrite       RBC  / WBC  / Hyaline  / Gran  / Sq Epi  / Non Sq Epi  / Bacteria     Urine Creatinine 36      [12-23-23 @ 22:39]  Urine Protein 8      [12-23-23 @ 22:39]  Urine Sodium 63      [12-23-23 @ 22:39]  Urine Urea Nitrogen 361      [12-23-23 @ 22:39]  Urine Potassium 18      [12-23-23 @ 22:39]  Urine Osmolality 282      [12-23-23 @ 22:39]    Iron 35, TIBC 247, %sat 14      [12-24-23 @ 07:18]  Ferritin 384      [12-24-23 @ 07:18]  PTH -- (Ca 8.8)      [12-24-23 @ 07:18]   49  Vitamin D (25OH) 46.5      [12-24-23 @ 07:18]  TSH 2.16      [12-24-23 @ 07:18]  Lipid: chol 92, TG 91, HDL 32, LDL --      [12-24-23 @ 07:18]    HCV 0.07, Nonreact      [12-24-23 @ 07:18]      RADIOLOGY & ADDITIONAL STUDIES:   [facility]   MD ARMOND BRIGGS MD ANGELA WONG, PA QIAN CHEN, NP    TEL:  OFFICE: [phone]  From 5pm-7am Answering Service [phone]    -- RENAL FOLLOW UP NOTE ---Date of Service 12-28-23 @ 13:18    Patient is a 68y old  Male who presents with a chief complaint of abnormal lab result.    Patient seen and examined at bedside. No chest pain/sob    VITALS:  T(F): 97.9 (12-28-23 @ 12:07), Max: 99 (12-28-23 @ 00:58)  HR: 64 (12-28-23 @ 12:07)  BP: 112/72 (12-28-23 @ 12:07)  RR: 18 (12-28-23 @ 12:07)  SpO2: 97% (12-28-23 @ 12:07)  Wt(kg): --    12-27 @ 07:01  -  12-28 @ 07:00  --------------------------------------------------------  IN: 960 mL / OUT: 0 mL / NET: 960 mL    12-28 @ 07:01  -  12-28 @ 13:18  --------------------------------------------------------  IN: 240 mL / OUT: 0 mL / NET: 240 mL          PHYSICAL EXAM:  General: NAD  Neck: No JVD  Respiratory: CTAB, no wheezes, rales or rhonchi  Cardiovascular: S1, S2, RRR  Gastrointestinal: BS+, soft, NT/ND  Extremities: No peripheral edema    Hospital Medications:   MEDICATIONS  (STANDING):  amLODIPine   Tablet 5 milliGRAM(s) Oral daily  atorvastatin 20 milliGRAM(s) Oral at bedtime  dextrose 5%. 1000 milliLiter(s) (50 mL/Hr) IV Continuous <Continuous>  dextrose 5%. 1000 milliLiter(s) (100 mL/Hr) IV Continuous <Continuous>  dextrose 50% Injectable 12.5 Gram(s) IV Push once  dextrose 50% Injectable 25 Gram(s) IV Push once  dextrose 50% Injectable 25 Gram(s) IV Push once  glucagon  Injectable 1 milliGRAM(s) IntraMuscular once  heparin   Injectable 5000 Unit(s) SubCutaneous every 8 hours  insulin lispro (ADMELOG) corrective regimen sliding scale   SubCutaneous three times a day before meals  insulin lispro (ADMELOG) corrective regimen sliding scale   SubCutaneous at bedtime      LABS:  12-28    141  |  110<H>  |  55<H>  ----------------------------<  109<H>  4.5   |  20<L>  |  2.22<H>    Ca    8.9      28 Dec 2023 06:50      Creatinine Trend: 2.22 <--, 2.54 <--, 2.96 <--, 3.35 <--, 3.39 <--, 3.50 <--, 3.47 <--                 9.8    9.04  )-----------( 320      ( 27 Dec 2023 07:11 )             29.5     Urine Studies:  Urinalysis - [12-28-23 @ 06:50]      Color  / Appearance  / SG  / pH       Gluc 109 / Ketone   / Bili  / Urobili        Blood  / Protein  / Leuk Est  / Nitrite       RBC  / WBC  / Hyaline  / Gran  / Sq Epi  / Non Sq Epi  / Bacteria     Urine Creatinine 36      [12-23-23 @ 22:39]  Urine Protein 8      [12-23-23 @ 22:39]  Urine Sodium 63      [12-23-23 @ 22:39]  Urine Urea Nitrogen 361      [12-23-23 @ 22:39]  Urine Potassium 18      [12-23-23 @ 22:39]  Urine Osmolality 282      [12-23-23 @ 22:39]    Iron 35, TIBC 247, %sat 14      [12-24-23 @ 07:18]  Ferritin 384      [12-24-23 @ 07:18]  PTH -- (Ca 8.8)      [12-24-23 @ 07:18]   49  Vitamin D (25OH) 46.5      [12-24-23 @ 07:18]  TSH 2.16      [12-24-23 @ 07:18]  Lipid: chol 92, TG 91, HDL 32, LDL --      [12-24-23 @ 07:18]    HCV 0.07, Nonreact      [12-24-23 @ 07:18]      RADIOLOGY & ADDITIONAL STUDIES:

## 2023-12-28 NOTE — PROGRESS NOTE ADULT - ASSESSMENT
69yo 80kg m w pmh htn, hld, dm, p/w abnormal lab result showing worsening renal function; in er, found to have iban; admit to medicine for further mgmt.    # IBAN (acute kidney injury)  likely ATN from multiple insults-- NSAID, + HCTZ + Recent flu dehydration / hypotension  downtrending creat  renal/bladder US no hydro  sodium bicarb per renal, dc today   nephro following  trial of oral hydration today; if cr trending down tomorrow , dc plan for tomorrow    # DM (diabetes mellitus)  hold home regimen  a1c 6.2  monitor fingerstick glu tidac + hs  low dose correctional scale lispro tidac + hs while inhouse    # HTN (hypertension)  norvasc  hold home lotensin, microzide in lieu of iban    # HLD (hyperlipidemia)  lipitor    vte ppx scd    dc planning likely tomorrow     Please contact with any questions or concerns [phone].   69yo 80kg m w pmh htn, hld, dm, p/w abnormal lab result showing worsening renal function; in er, found to have iban; admit to medicine for further mgmt.    # IBAN (acute kidney injury)  likely ATN from multiple insults-- NSAID, + HCTZ + Recent flu dehydration / hypotension  downtrending creat  renal/bladder US no hydro  sodium bicarb per renal, dc today   nephro following  trial of oral hydration today; if cr trending down tomorrow , dc plan for tomorrow    # DM (diabetes mellitus)  hold home regimen  a1c 6.2  monitor fingerstick glu tidac + hs  low dose correctional scale lispro tidac + hs while inhouse    # HTN (hypertension)  norvasc  hold home lotensin, microzide in lieu of iban    # HLD (hyperlipidemia)  lipitor    vte ppx scd    dc planning likely tomorrow     Please contact with any questions or concerns [phone].

## 2023-12-28 NOTE — DISCHARGE NOTE PROVIDER - NSDCCPCAREPLAN_GEN_ALL_CORE_FT
PRINCIPAL DISCHARGE DIAGNOSIS  Diagnosis: Acute renal failure  Assessment and Plan of Treatment:      PRINCIPAL DISCHARGE DIAGNOSIS  Diagnosis: Acute renal failure  Assessment and Plan of Treatment: Avoid taking NSAIDs (ex: Ibuprofen, Advil, Celebrex, Naprosyn) and other agents that can harm the kidneys such as intravenous contrast for diagnostic testing, combination cold medications, etc. until you are instructed to do so by your Primary Care Physician.  Have all of your medications adjusted for your renal function by your Health Care Provider.  Blood pressure control is important.  Take all medication as prescribed.  Do not overconsume foods that are high in potassium, such as bananas, until you are instructed to do so by your primary care physician.

## 2023-12-28 NOTE — DISCHARGE NOTE PROVIDER - NSDCMRMEDTOKEN_GEN_ALL_CORE_FT
benazepril 40 mg oral tablet: 1 tab(s) orally once a day  hydroCHLOROthiazide 25 mg oral tablet: 1 tab(s) orally once a day  Lipitor 20 mg oral tablet: 1 tab(s) orally once a day (at bedtime)  MetFORMIN (Eqv-Fortamet) 1000 mg oral tablet, extended release: 1 tab(s) orally once a day (at bedtime)  Norvasc 5 mg oral tablet: 1 tab(s) orally once a day   benazepril 40 mg oral tablet: 1 tab(s) orally once a day  Lipitor 20 mg oral tablet: 1 tab(s) orally once a day (at bedtime)  MetFORMIN (Eqv-Fortamet) 1000 mg oral tablet, extended release: 1 tab(s) orally once a day (at bedtime)  Norvasc 5 mg oral tablet: 1 tab(s) orally once a day   Lipitor 20 mg oral tablet: 1 tab(s) orally once a day (at bedtime)  MetFORMIN (Eqv-Fortamet) 1000 mg oral tablet, extended release: 1 tab(s) orally once a day (at bedtime)  Norvasc 5 mg oral tablet: 1 tab(s) orally once a day

## 2023-12-28 NOTE — PROGRESS NOTE ADULT - ASSESSMENT
67yo 80kg m w pmh htn, hld, dm, p/w abnormal lab result showing worsening renal function; in    IBAN  Improving with IV fluids  baseline scr unknown, per pt no prior history of kidney disease  iban likely sec to nsaid use/ hctz/ ace and hypotensive episode.  renal sonogram with no hydro  bladder neg for retention   urine lytes inconclusive  UA with no protein no rbc  S/P IVF x1day.   Renal function improving - now off IVF.  Monitor closely   avoid nephrotoxics, nsaids rca    hyperkalemia  sec to RF  no retention   improved   low k diet   monitor closely     HTN  BP low / marginal.  Avoid hypotension.  If BP remains low, consider d/c'ing amlodipine.  monitor     Acidosis  Improved after fluids with bicarb  Now marginal.  If worsens, will start NaHCO3 650mg PO TID.  Monitor serum CO2.       69yo 80kg m w pmh htn, hld, dm, p/w abnormal lab result showing worsening renal function; in    IBAN  Improving with IV fluids  baseline scr unknown, per pt no prior history of kidney disease  iban likely sec to nsaid use/ hctz/ ace and hypotensive episode.  renal sonogram with no hydro  bladder neg for retention   urine lytes inconclusive  UA with no protein no rbc  S/P IVF x1day.   Renal function improving - now off IVF.  Monitor closely   avoid nephrotoxics, nsaids rca    hyperkalemia  sec to RF  no retention   improved   low k diet   monitor closely     HTN  BP low / marginal.  Avoid hypotension.  If BP remains low, consider d/c'ing amlodipine.  monitor     Acidosis  Improved after fluids with bicarb  Monitor serum CO2.

## 2023-12-28 NOTE — DISCHARGE NOTE PROVIDER - PROVIDER TOKENS
PROVIDER:[TOKEN:[[id number]],FOLLOWUP:[1 week]] PROVIDER:[TOKEN:[[id number]],FOLLOWUP:[1 week]]

## 2023-12-28 NOTE — DISCHARGE NOTE PROVIDER - CARE PROVIDER_API CALL
Juwan Levy  Nephrology  [address] [place] [id number]  Phone: [phone]  Fax: [phone]  Follow Up Time: 1 week   Juwan Levy  Nephrology  [address] [address] [place] [id number]  Phone: [phone]  Fax: [phone]  Follow Up Time: 1 week

## 2023-12-28 NOTE — DISCHARGE NOTE PROVIDER - CARE PROVIDERS DIRECT ADDRESSES
[email or web address]

## 2023-12-29 VITALS
HEART RATE: 69 BPM | SYSTOLIC BLOOD PRESSURE: 105 MMHG | DIASTOLIC BLOOD PRESSURE: 73 MMHG | TEMPERATURE: 98 F | RESPIRATION RATE: 18 BRPM | OXYGEN SATURATION: 99 %

## 2023-12-29 LAB
ANION GAP SERPL CALC-SCNC: 15 MMOL/L — SIGNIFICANT CHANGE UP (ref 5–17)
ANION GAP SERPL CALC-SCNC: 15 MMOL/L — SIGNIFICANT CHANGE UP (ref 5–17)
BUN SERPL-MCNC: 47 MG/DL — HIGH (ref 7–23)
BUN SERPL-MCNC: 47 MG/DL — HIGH (ref 7–23)
CALCIUM SERPL-MCNC: 9.4 MG/DL — SIGNIFICANT CHANGE UP (ref 8.4–10.5)
CALCIUM SERPL-MCNC: 9.4 MG/DL — SIGNIFICANT CHANGE UP (ref 8.4–10.5)
CHLORIDE SERPL-SCNC: 108 MMOL/L — SIGNIFICANT CHANGE UP (ref 96–108)
CHLORIDE SERPL-SCNC: 108 MMOL/L — SIGNIFICANT CHANGE UP (ref 96–108)
CO2 SERPL-SCNC: 19 MMOL/L — LOW (ref 22–31)
CO2 SERPL-SCNC: 19 MMOL/L — LOW (ref 22–31)
CREAT SERPL-MCNC: 2.11 MG/DL — HIGH (ref 0.5–1.3)
CREAT SERPL-MCNC: 2.11 MG/DL — HIGH (ref 0.5–1.3)
EGFR: 33 ML/MIN/1.73M2 — LOW
EGFR: 33 ML/MIN/1.73M2 — LOW
GLUCOSE BLDC GLUCOMTR-MCNC: 120 MG/DL — HIGH (ref 70–99)
GLUCOSE BLDC GLUCOMTR-MCNC: 120 MG/DL — HIGH (ref 70–99)
GLUCOSE BLDC GLUCOMTR-MCNC: 88 MG/DL — SIGNIFICANT CHANGE UP (ref 70–99)
GLUCOSE BLDC GLUCOMTR-MCNC: 88 MG/DL — SIGNIFICANT CHANGE UP (ref 70–99)
GLUCOSE BLDC GLUCOMTR-MCNC: 97 MG/DL — SIGNIFICANT CHANGE UP (ref 70–99)
GLUCOSE BLDC GLUCOMTR-MCNC: 97 MG/DL — SIGNIFICANT CHANGE UP (ref 70–99)
GLUCOSE SERPL-MCNC: 110 MG/DL — HIGH (ref 70–99)
GLUCOSE SERPL-MCNC: 110 MG/DL — HIGH (ref 70–99)
POTASSIUM SERPL-MCNC: 4.6 MMOL/L — SIGNIFICANT CHANGE UP (ref 3.5–5.3)
POTASSIUM SERPL-MCNC: 4.6 MMOL/L — SIGNIFICANT CHANGE UP (ref 3.5–5.3)
POTASSIUM SERPL-SCNC: 4.6 MMOL/L — SIGNIFICANT CHANGE UP (ref 3.5–5.3)
POTASSIUM SERPL-SCNC: 4.6 MMOL/L — SIGNIFICANT CHANGE UP (ref 3.5–5.3)
SODIUM SERPL-SCNC: 142 MMOL/L — SIGNIFICANT CHANGE UP (ref 135–145)
SODIUM SERPL-SCNC: 142 MMOL/L — SIGNIFICANT CHANGE UP (ref 135–145)

## 2023-12-29 PROCEDURE — 99285 EMERGENCY DEPT VISIT HI MDM: CPT

## 2023-12-29 PROCEDURE — 84443 ASSAY THYROID STIM HORMONE: CPT

## 2023-12-29 PROCEDURE — 84100 ASSAY OF PHOSPHORUS: CPT

## 2023-12-29 PROCEDURE — 82947 ASSAY GLUCOSE BLOOD QUANT: CPT

## 2023-12-29 PROCEDURE — 85025 COMPLETE CBC W/AUTO DIFF WBC: CPT

## 2023-12-29 PROCEDURE — 76775 US EXAM ABDO BACK WALL LIM: CPT

## 2023-12-29 PROCEDURE — 85027 COMPLETE CBC AUTOMATED: CPT

## 2023-12-29 PROCEDURE — 82962 GLUCOSE BLOOD TEST: CPT

## 2023-12-29 PROCEDURE — 82728 ASSAY OF FERRITIN: CPT

## 2023-12-29 PROCEDURE — 83935 ASSAY OF URINE OSMOLALITY: CPT

## 2023-12-29 PROCEDURE — 83735 ASSAY OF MAGNESIUM: CPT

## 2023-12-29 PROCEDURE — 80061 LIPID PANEL: CPT

## 2023-12-29 PROCEDURE — 84295 ASSAY OF SERUM SODIUM: CPT

## 2023-12-29 PROCEDURE — 82272 OCCULT BLD FECES 1-3 TESTS: CPT

## 2023-12-29 PROCEDURE — 80048 BASIC METABOLIC PNL TOTAL CA: CPT

## 2023-12-29 PROCEDURE — 83605 ASSAY OF LACTIC ACID: CPT

## 2023-12-29 PROCEDURE — 84156 ASSAY OF PROTEIN URINE: CPT

## 2023-12-29 PROCEDURE — 86803 HEPATITIS C AB TEST: CPT

## 2023-12-29 PROCEDURE — 85018 HEMOGLOBIN: CPT

## 2023-12-29 PROCEDURE — 93005 ELECTROCARDIOGRAM TRACING: CPT

## 2023-12-29 PROCEDURE — 85730 THROMBOPLASTIN TIME PARTIAL: CPT

## 2023-12-29 PROCEDURE — 83010 ASSAY OF HAPTOGLOBIN QUANT: CPT

## 2023-12-29 PROCEDURE — 85045 AUTOMATED RETICULOCYTE COUNT: CPT

## 2023-12-29 PROCEDURE — 82803 BLOOD GASES ANY COMBINATION: CPT

## 2023-12-29 PROCEDURE — 82746 ASSAY OF FOLIC ACID SERUM: CPT

## 2023-12-29 PROCEDURE — 0225U NFCT DS DNA&RNA 21 SARSCOV2: CPT

## 2023-12-29 PROCEDURE — 85014 HEMATOCRIT: CPT

## 2023-12-29 PROCEDURE — 83036 HEMOGLOBIN GLYCOSYLATED A1C: CPT

## 2023-12-29 PROCEDURE — 84132 ASSAY OF SERUM POTASSIUM: CPT

## 2023-12-29 PROCEDURE — 81001 URINALYSIS AUTO W/SCOPE: CPT

## 2023-12-29 PROCEDURE — 82570 ASSAY OF URINE CREATININE: CPT

## 2023-12-29 PROCEDURE — 36415 COLL VENOUS BLD VENIPUNCTURE: CPT

## 2023-12-29 PROCEDURE — 87086 URINE CULTURE/COLONY COUNT: CPT

## 2023-12-29 PROCEDURE — 83550 IRON BINDING TEST: CPT

## 2023-12-29 PROCEDURE — 82330 ASSAY OF CALCIUM: CPT

## 2023-12-29 PROCEDURE — 80053 COMPREHEN METABOLIC PANEL: CPT

## 2023-12-29 PROCEDURE — 84540 ASSAY OF URINE/UREA-N: CPT

## 2023-12-29 PROCEDURE — 82306 VITAMIN D 25 HYDROXY: CPT

## 2023-12-29 PROCEDURE — 82310 ASSAY OF CALCIUM: CPT

## 2023-12-29 PROCEDURE — 83615 LACTATE (LD) (LDH) ENZYME: CPT

## 2023-12-29 PROCEDURE — 84133 ASSAY OF URINE POTASSIUM: CPT

## 2023-12-29 PROCEDURE — 83970 ASSAY OF PARATHORMONE: CPT

## 2023-12-29 PROCEDURE — 83540 ASSAY OF IRON: CPT

## 2023-12-29 PROCEDURE — 84300 ASSAY OF URINE SODIUM: CPT

## 2023-12-29 PROCEDURE — 82607 VITAMIN B-12: CPT

## 2023-12-29 PROCEDURE — 85610 PROTHROMBIN TIME: CPT

## 2023-12-29 PROCEDURE — 82435 ASSAY OF BLOOD CHLORIDE: CPT

## 2023-12-29 RX ORDER — SODIUM BICARBONATE 1 MEQ/ML
650 SYRINGE (ML) INTRAVENOUS
Refills: 0 | Status: DISCONTINUED | OUTPATIENT
Start: 2023-12-29 | End: 2023-12-29

## 2023-12-29 RX ADMIN — Medication 650 MILLIGRAM(S): at 11:49

## 2023-12-29 RX ADMIN — AMLODIPINE BESYLATE 5 MILLIGRAM(S): 2.5 TABLET ORAL at 05:24

## 2023-12-29 NOTE — PROGRESS NOTE ADULT - SUBJECTIVE AND OBJECTIVE BOX
[facility] [place] [facility]   MD ARMOND BRIGGS MD ANGELA WONG, PA QIAN CHEN, NP    TEL:  OFFICE: [phone]  From 5pm-7am Answering Service [phone]    -- RENAL FOLLOW UP NOTE ---Date of Service 12-29-23 @ 10:51    Patient is a 68y old  Male who presents with a chief complaint of abnormal lab result.    Patient seen and examined at bedside. No chest pain/sob    VITALS:  T(F): 97 (12-29-23 @ 10:07), Max: 98.5 (12-28-23 @ 20:52)  HR: 69 (12-29-23 @ 10:07)  BP: 104/70 (12-29-23 @ 10:07)  RR: 18 (12-29-23 @ 10:07)  SpO2: 100% (12-29-23 @ 10:07)  Wt(kg): --    12-28 @ 07:01  -  12-29 @ 07:00  --------------------------------------------------------  IN: 480 mL / OUT: 0 mL / NET: 480 mL      PHYSICAL EXAM:  General: NAD  Neck: No JVD  Respiratory: CTAB, no wheezes, rales or rhonchi  Cardiovascular: S1, S2, RRR  Gastrointestinal: BS+, soft, NT/ND  Extremities: No peripheral edema    Hospital Medications:   MEDICATIONS  (STANDING):  amLODIPine   Tablet 5 milliGRAM(s) Oral daily  atorvastatin 20 milliGRAM(s) Oral at bedtime  dextrose 5%. 1000 milliLiter(s) (50 mL/Hr) IV Continuous <Continuous>  dextrose 5%. 1000 milliLiter(s) (100 mL/Hr) IV Continuous <Continuous>  dextrose 50% Injectable 12.5 Gram(s) IV Push once  dextrose 50% Injectable 25 Gram(s) IV Push once  dextrose 50% Injectable 25 Gram(s) IV Push once  glucagon  Injectable 1 milliGRAM(s) IntraMuscular once  heparin   Injectable 5000 Unit(s) SubCutaneous every 8 hours  insulin lispro (ADMELOG) corrective regimen sliding scale   SubCutaneous three times a day before meals  insulin lispro (ADMELOG) corrective regimen sliding scale   SubCutaneous at bedtime      LABS:  12-29    142  |  108  |  47<H>  ----------------------------<  110<H>  4.6   |  19<L>  |  2.11<H>    Ca    9.4      29 Dec 2023 07:24      Creatinine Trend: 2.11 <--, 2.22 <--, 2.54 <--, 2.96 <--, 3.35 <--, 3.39 <--, 3.50 <--, 3.47 <--    Urine Studies:  Urinalysis - [12-29-23 @ 07:24]      Color  / Appearance  / SG  / pH       Gluc 110 / Ketone   / Bili  / Urobili        Blood  / Protein  / Leuk Est  / Nitrite       RBC  / WBC  / Hyaline  / Gran  / Sq Epi  / Non Sq Epi  / Bacteria     Urine Creatinine 36      [12-23-23 @ 22:39]  Urine Protein 8      [12-23-23 @ 22:39]  Urine Sodium 63      [12-23-23 @ 22:39]  Urine Urea Nitrogen 361      [12-23-23 @ 22:39]  Urine Potassium 18      [12-23-23 @ 22:39]  Urine Osmolality 282      [12-23-23 @ 22:39]    Iron 35, TIBC 247, %sat 14      [12-24-23 @ 07:18]  Ferritin 384      [12-24-23 @ 07:18]  PTH -- (Ca 8.8)      [12-24-23 @ 07:18]   49  Vitamin D (25OH) 46.5      [12-24-23 @ 07:18]  TSH 2.16      [12-24-23 @ 07:18]  Lipid: chol 92, TG 91, HDL 32, LDL --      [12-24-23 @ 07:18]    HCV 0.07, Nonreact      [12-24-23 @ 07:18]      RADIOLOGY & ADDITIONAL STUDIES:   [facility] [place] [facility]   MD ARMOND BRIGGS MD ANGELA WONG, PA QIAN CHEN, NP    TEL:  OFFICE: [phone]  From 5pm-7am Answering Service [phone]    -- RENAL FOLLOW UP NOTE ---Date of Service 12-29-23 @ 10:51    Patient is a 68y old  Male who presents with a chief complaint of abnormal lab result.    Patient seen and examined at bedside. No chest pain/sob    VITALS:  T(F): 97 (12-29-23 @ 10:07), Max: 98.5 (12-28-23 @ 20:52)  HR: 69 (12-29-23 @ 10:07)  BP: 104/70 (12-29-23 @ 10:07)  RR: 18 (12-29-23 @ 10:07)  SpO2: 100% (12-29-23 @ 10:07)  Wt(kg): --    12-28 @ 07:01  -  12-29 @ 07:00  --------------------------------------------------------  IN: 480 mL / OUT: 0 mL / NET: 480 mL      PHYSICAL EXAM:  General: NAD  Neck: No JVD  Respiratory: CTAB, no wheezes, rales or rhonchi  Cardiovascular: S1, S2, RRR  Gastrointestinal: BS+, soft, NT/ND  Extremities: No peripheral edema    Hospital Medications:   MEDICATIONS  (STANDING):  amLODIPine   Tablet 5 milliGRAM(s) Oral daily  atorvastatin 20 milliGRAM(s) Oral at bedtime  dextrose 5%. 1000 milliLiter(s) (50 mL/Hr) IV Continuous <Continuous>  dextrose 5%. 1000 milliLiter(s) (100 mL/Hr) IV Continuous <Continuous>  dextrose 50% Injectable 12.5 Gram(s) IV Push once  dextrose 50% Injectable 25 Gram(s) IV Push once  dextrose 50% Injectable 25 Gram(s) IV Push once  glucagon  Injectable 1 milliGRAM(s) IntraMuscular once  heparin   Injectable 5000 Unit(s) SubCutaneous every 8 hours  insulin lispro (ADMELOG) corrective regimen sliding scale   SubCutaneous three times a day before meals  insulin lispro (ADMELOG) corrective regimen sliding scale   SubCutaneous at bedtime      LABS:  12-29    142  |  108  |  47<H>  ----------------------------<  110<H>  4.6   |  19<L>  |  2.11<H>    Ca    9.4      29 Dec 2023 07:24      Creatinine Trend: 2.11 <--, 2.22 <--, 2.54 <--, 2.96 <--, 3.35 <--, 3.39 <--, 3.50 <--, 3.47 <--    Urine Studies:  Urinalysis - [12-29-23 @ 07:24]      Color  / Appearance  / SG  / pH       Gluc 110 / Ketone   / Bili  / Urobili        Blood  / Protein  / Leuk Est  / Nitrite       RBC  / WBC  / Hyaline  / Gran  / Sq Epi  / Non Sq Epi  / Bacteria     Urine Creatinine 36      [12-23-23 @ 22:39]  Urine Protein 8      [12-23-23 @ 22:39]  Urine Sodium 63      [12-23-23 @ 22:39]  Urine Urea Nitrogen 361      [12-23-23 @ 22:39]  Urine Potassium 18      [12-23-23 @ 22:39]  Urine Osmolality 282      [12-23-23 @ 22:39]    Iron 35, TIBC 247, %sat 14      [12-24-23 @ 07:18]  Ferritin 384      [12-24-23 @ 07:18]  PTH -- (Ca 8.8)      [12-24-23 @ 07:18]   49  Vitamin D (25OH) 46.5      [12-24-23 @ 07:18]  TSH 2.16      [12-24-23 @ 07:18]  Lipid: chol 92, TG 91, HDL 32, LDL --      [12-24-23 @ 07:18]    HCV 0.07, Nonreact      [12-24-23 @ 07:18]      RADIOLOGY & ADDITIONAL STUDIES:

## 2023-12-29 NOTE — DISCHARGE NOTE NURSING/CASE MANAGEMENT/SOCIAL WORK - NSDCPEFALRISK_GEN_ALL_CORE
For information on Fall & Injury Prevention, visit: [email or web address] OR  [email or web address] OR  https://www.cdc.gov/steadi/patient.html For information on Fall & Injury Prevention, visit: [email or web address] OR  [email or web address] OR  https://www.cdc.gov/steadi/patient.html

## 2023-12-29 NOTE — PROGRESS NOTE ADULT - NS ATTEND AMEND GEN_ALL_CORE FT
improving renal function   continue ivf
improving renal function with ivf  continue ivf
monitor scr off ivf   if improving tomorrow   pt can be discharged
renal function improving off ivf  can be discharged with outpt follow up of bmp  on 1/2 with pcp  hold ace and hctz on discharge -pt educated

## 2023-12-29 NOTE — DISCHARGE NOTE NURSING/CASE MANAGEMENT/SOCIAL WORK - PATIENT PORTAL LINK FT
You can access the FollowMyHealth Patient Portal offered by [facility] by registering at the following website: [email or web address]. By joining [facility]’s FollowMyHealth portal, you will also be able to view your health information using other applications (apps) compatible with our system. You can access the FollowMyHealth Patient Portal offered by [facility] by registering at the following website: [email or web address]. By joining [employer]’s FollowMyHealth portal, you will also be able to view your health information using other applications (apps) compatible with our system.

## 2023-12-29 NOTE — PROGRESS NOTE ADULT - ASSESSMENT
67yo 80kg m w pmh htn, hld, dm, p/w abnormal lab result showing worsening renal function; in er, found to have iban; admit to medicine for further mgmt.    # IBAN (acute kidney injury)  likely ATN from multiple insults-- NSAID, + HCTZ + Recent flu dehydration / hypotension  downtrending creat  renal/bladder US no hydro  sodium bicarb per renal, dc today   nephro following -> stable for DC from their stand point  s/p trial of oral hydration yesterday - Cr trending down today 2.22 -> 2.11    # DM (diabetes mellitus)  hold home regimen  a1c 6.2  monitor fingerstick glu tidac + hs  low dose correctional scale lispro tidac + hs while inhouse    # HTN (hypertension)  norvasc  hold home lotensin, microzide in lieu of iban    # HLD (hyperlipidemia)  lipitor    vte ppx heparin subq    dc planning   69yo 80kg m w pmh htn, hld, dm, p/w abnormal lab result showing worsening renal function; in er, found to have iban; admit to medicine for further mgmt.    # IBAN (acute kidney injury)  likely ATN from multiple insults-- NSAID, + HCTZ + Recent flu dehydration / hypotension  downtrending creat  renal/bladder US no hydro  sodium bicarb per renal, dc today   nephro following -> stable for DC from their stand point  s/p trial of oral hydration yesterday - Cr trending down today 2.22 -> 2.11    # DM (diabetes mellitus)  hold home regimen  a1c 6.2  monitor fingerstick glu tidac + hs  low dose correctional scale lispro tidac + hs while inhouse    # HTN (hypertension)  norvasc  hold home lotensin, microzide in lieu of iban    # HLD (hyperlipidemia)  lipitor    vte ppx heparin subq    dc planning

## 2023-12-29 NOTE — PROGRESS NOTE ADULT - SUBJECTIVE AND OBJECTIVE BOX
SUBJECTIVE / OVERNIGHT EVENTS:    patient seen and examined  resting comfortably in bed  Cr improving this AM    --------------------------------------------------------------------------------------------  LABS:    12-29    142  |  108  |  47<H>  ----------------------------<  110<H>  4.6   |  19<L>  |  2.11<H>    Ca    9.4      29 Dec 2023 07:24        CAPILLARY BLOOD GLUCOSE      POCT Blood Glucose.: 97 mg/dL (29 Dec 2023 08:41)  POCT Blood Glucose.: 142 mg/dL (28 Dec 2023 21:04)  POCT Blood Glucose.: 100 mg/dL (28 Dec 2023 16:34)  POCT Blood Glucose.: 115 mg/dL (28 Dec 2023 12:37)        Urinalysis Basic - ( 29 Dec 2023 07:24 )    Color: x / Appearance: x / SG: x / pH: x  Gluc: 110 mg/dL / Ketone: x  / Bili: x / Urobili: x   Blood: x / Protein: x / Nitrite: x   Leuk Esterase: x / RBC: x / WBC x   Sq Epi: x / Non Sq Epi: x / Bacteria: x        RADIOLOGY & ADDITIONAL TESTS:    Imaging Personally Reviewed:  [x] YES  [ ] NO    Consultant(s) Notes Reviewed:  [x] YES  [ ] NO    MEDICATIONS  (STANDING):  amLODIPine   Tablet 5 milliGRAM(s) Oral daily  atorvastatin 20 milliGRAM(s) Oral at bedtime  dextrose 5%. 1000 milliLiter(s) (50 mL/Hr) IV Continuous <Continuous>  dextrose 5%. 1000 milliLiter(s) (100 mL/Hr) IV Continuous <Continuous>  dextrose 50% Injectable 25 Gram(s) IV Push once  dextrose 50% Injectable 12.5 Gram(s) IV Push once  dextrose 50% Injectable 25 Gram(s) IV Push once  glucagon  Injectable 1 milliGRAM(s) IntraMuscular once  heparin   Injectable 5000 Unit(s) SubCutaneous every 8 hours  insulin lispro (ADMELOG) corrective regimen sliding scale   SubCutaneous three times a day before meals  insulin lispro (ADMELOG) corrective regimen sliding scale   SubCutaneous at bedtime  sodium bicarbonate 650 milliGRAM(s) Oral two times a day    MEDICATIONS  (PRN):  acetaminophen     Tablet .. 650 milliGRAM(s) Oral every 6 hours PRN Temp greater or equal to 38C (100.4F), Mild Pain (1 - 3)  aluminum hydroxide/magnesium hydroxide/simethicone Suspension 30 milliLiter(s) Oral every 4 hours PRN Dyspepsia  dextrose Oral Gel 15 Gram(s) Oral once PRN Blood Glucose LESS THAN 70 milliGRAM(s)/deciliter  melatonin 3 milliGRAM(s) Oral at bedtime PRN Insomnia  ondansetron Injectable 4 milliGRAM(s) IV Push every 8 hours PRN Nausea and/or Vomiting      Care Discussed with Consultants/Other Providers [x] YES  [ ] NO    Vital Signs Last 24 Hrs  T(C): 36.1 (29 Dec 2023 10:07), Max: 36.9 (28 Dec 2023 20:52)  T(F): 97 (29 Dec 2023 10:07), Max: 98.5 (28 Dec 2023 20:52)  HR: 69 (29 Dec 2023 10:07) (60 - 69)  BP: 104/70 (29 Dec 2023 10:07) (101/66 - 112/72)  BP(mean): --  RR: 18 (29 Dec 2023 10:07) (18 - 18)  SpO2: 100% (29 Dec 2023 10:07) (97% - 100%)    Parameters below as of 29 Dec 2023 10:07  Patient On (Oxygen Delivery Method): room air      I&O's Summary    28 Dec 2023 07:01  -  29 Dec 2023 07:00  --------------------------------------------------------  IN: 480 mL / OUT: 0 mL / NET: 480 mL    PHYSICAL EXAM:  GENERAL: NAD, AAOx3  HEAD:  Atraumatic, Normocephalic  EYES: EOMI; conjunctiva and sclera clear  NECK: Supple, No JVD, No LAD  CHEST/LUNG: B/L air entry; No wheezes, rales or rhonci   HEART: Regular rate and rhythm; No murmurs, rubs, or gallops  ABDOMEN: Soft, Nontender, Nondistended; Bowel sounds present  EXTREMITIES:  2+ Peripheral Pulses, No clubbing, cyanosis, or edema  SKIN: No rashes or lesions

## 2023-12-29 NOTE — PROGRESS NOTE ADULT - REASON FOR ADMISSION
abnormal lab result

## 2023-12-29 NOTE — PROGRESS NOTE ADULT - ASSESSMENT
67yo 80kg m w pmh htn, hld, dm, p/w abnormal lab result showing worsening renal function.  Nephrology consulted for IBAN.    IBAN  Improving with IV fluids  baseline scr unknown, per pt no prior history of kidney disease  iban likely sec to nsaid use/ hctz/ ace and hypotensive episode.  renal sonogram with no hydro  bladder neg for retention   urine lytes inconclusive  UA with no protein no rbc  Renal function improving - now off IVF x1 day.  Monitor closely.  avoid nephrotoxics, nsaids rca    hyperkalemia  sec to RF  no retention   improved   low k diet   monitor closely     HTN  BP low / marginal.  Avoid hypotension.  If BP remains low, consider d/c'ing amlodipine.  monitor     Acidosis  In setting of RF.  Improved after fluids with bicarb.  Start NaHCO3 650mg BID.  Monitor serum CO2.       67yo 80kg m w pmh htn, hld, dm, p/w abnormal lab result showing worsening renal function.  Nephrology consulted for IBAN.    IBAN  Improving with IV fluids  baseline scr unknown, per pt no prior history of kidney disease  iban likely sec to nsaid use/ hctz/ ace and hypotensive episode.  renal sonogram with no hydro  bladder neg for retention   urine lytes inconclusive  UA with no protein no rbc  Renal function improving - now off IVF x1 day.  Monitor closely.  Pt stable from renal perspective for d/c; to f/u w/ nephrology outpatient.  avoid nephrotoxics, nsaids rca    hyperkalemia  sec to RF  no retention   improved   low k diet   monitor closely     HTN  BP low / marginal.  Avoid hypotension.  If BP remains low, consider d/c'ing amlodipine.  monitor     Acidosis  In setting of RF.  Improved after fluids with bicarb.  Start NaHCO3 650mg BID.  Monitor serum CO2.       67yo 80kg m w pmh htn, hld, dm, p/w abnormal lab result showing worsening renal function.  Nephrology consulted for IBAN.    IBAN  Improving with IV fluids  baseline scr unknown, per pt no prior history of kidney disease  iban likely sec to nsaid use/ hctz/ ace and hypotensive episode.  renal sonogram with no hydro  bladder neg for retention   urine lytes inconclusive  UA with no protein no rbc  Renal function improving - now off IVF x1 day.  Monitor closely.  Pt stable from renal perspective for d/c; to f/u bmp outpt  .pt has appointment on 1/2  avoid nephrotoxics, nsaids rca    hyperkalemia  sec to RF  no retention   improved   low k diet   monitor closely     HTN  BP low / marginal.  Avoid hypotension.  If BP remains low, consider d/c'ing amlodipine.  monitor     Acidosis  In setting of RF.  Improved after fluids with bicarb.  Monitor serum CO2.       69yo 80kg m w pmh htn, hld, dm, p/w abnormal lab result showing worsening renal function.  Nephrology consulted for IBAN.    IBAN  Improving with IV fluids  baseline scr unknown, per pt no prior history of kidney disease  iban likely sec to nsaid use/ hctz/ ace and hypotensive episode.  renal sonogram with no hydro  bladder neg for retention   urine lytes inconclusive  UA with no protein no rbc  Renal function improving - now off IVF x1 day.  Monitor closely.  Pt stable from renal perspective for d/c; to f/u bmp outpt  .pt has appointment on 1/2  avoid nephrotoxics, nsaids rca    hyperkalemia  sec to RF  no retention   improved   low k diet   monitor closely     HTN  BP low / marginal.  Avoid hypotension.  If BP remains low, consider d/c'ing amlodipine.  monitor     Acidosis  In setting of RF.  Improved after fluids with bicarb.  Monitor serum CO2.

## 2024-02-05 RX ORDER — AMLODIPINE BESYLATE 2.5 MG/1
1 TABLET ORAL
Refills: 0 | DISCHARGE

## 2024-02-05 RX ORDER — BENAZEPRIL HYDROCHLORIDE 40 MG/1
1 TABLET ORAL
Refills: 0 | DISCHARGE

## 2024-02-05 RX ORDER — HYDROCHLOROTHIAZIDE 25 MG
1 TABLET ORAL
Refills: 0 | DISCHARGE

## 2024-02-05 RX ORDER — METFORMIN HYDROCHLORIDE 850 MG/1
1 TABLET ORAL
Refills: 0 | DISCHARGE

## 2024-02-05 RX ORDER — ATORVASTATIN CALCIUM 80 MG/1
1 TABLET, FILM COATED ORAL
Refills: 0 | DISCHARGE

## 2025-08-05 ENCOUNTER — NON-APPOINTMENT (OUTPATIENT)
Age: 70
End: 2025-08-05

## 2025-08-06 ENCOUNTER — NON-APPOINTMENT (OUTPATIENT)
Age: 70
End: 2025-08-06

## 2025-08-06 PROBLEM — Z00.00 ENCOUNTER FOR PREVENTIVE HEALTH EXAMINATION: Status: ACTIVE | Noted: 2025-08-06

## 2025-08-07 ENCOUNTER — NON-APPOINTMENT (OUTPATIENT)
Age: 70
End: 2025-08-07

## 2025-08-07 ENCOUNTER — APPOINTMENT (OUTPATIENT)
Dept: ORTHOPEDIC SURGERY | Facility: CLINIC | Age: 70
End: 2025-08-07
Payer: MEDICARE

## 2025-08-07 ENCOUNTER — TRANSCRIPTION ENCOUNTER (OUTPATIENT)
Age: 70
End: 2025-08-07

## 2025-08-07 VITALS
DIASTOLIC BLOOD PRESSURE: 74 MMHG | HEART RATE: 83 BPM | TEMPERATURE: 98.2 F | HEIGHT: 70 IN | WEIGHT: 200 LBS | SYSTOLIC BLOOD PRESSURE: 112 MMHG | BODY MASS INDEX: 28.63 KG/M2 | OXYGEN SATURATION: 97 %

## 2025-08-07 DIAGNOSIS — Z78.9 OTHER SPECIFIED HEALTH STATUS: ICD-10-CM

## 2025-08-07 DIAGNOSIS — Z84.89 FAMILY HISTORY OF OTHER SPECIFIED CONDITIONS: ICD-10-CM

## 2025-08-07 DIAGNOSIS — M17.0 BILATERAL PRIMARY OSTEOARTHRITIS OF KNEE: ICD-10-CM

## 2025-08-07 PROCEDURE — 99203 OFFICE O/P NEW LOW 30 MIN: CPT

## 2025-08-07 RX ORDER — SEMAGLUTIDE 1.34 MG/ML
INJECTION, SOLUTION SUBCUTANEOUS
Refills: 0 | Status: ACTIVE | COMMUNITY

## 2025-08-08 PROBLEM — M17.0 PRIMARY LOCALIZED OSTEOARTHRITIS OF BOTH KNEES: Status: ACTIVE | Noted: 2025-08-08
